# Patient Record
Sex: FEMALE | Race: WHITE | NOT HISPANIC OR LATINO
[De-identification: names, ages, dates, MRNs, and addresses within clinical notes are randomized per-mention and may not be internally consistent; named-entity substitution may affect disease eponyms.]

---

## 2023-12-04 ENCOUNTER — NON-APPOINTMENT (OUTPATIENT)
Age: 28
End: 2023-12-04

## 2023-12-04 ENCOUNTER — APPOINTMENT (OUTPATIENT)
Dept: INTERNAL MEDICINE | Facility: CLINIC | Age: 28
End: 2023-12-04
Payer: COMMERCIAL

## 2023-12-04 VITALS
HEIGHT: 65 IN | BODY MASS INDEX: 26.66 KG/M2 | DIASTOLIC BLOOD PRESSURE: 80 MMHG | OXYGEN SATURATION: 96 % | HEART RATE: 60 BPM | WEIGHT: 160 LBS | TEMPERATURE: 97.3 F | RESPIRATION RATE: 18 BRPM | SYSTOLIC BLOOD PRESSURE: 126 MMHG

## 2023-12-04 DIAGNOSIS — Z00.00 ENCOUNTER FOR GENERAL ADULT MEDICAL EXAMINATION W/OUT ABNORMAL FINDINGS: ICD-10-CM

## 2023-12-04 DIAGNOSIS — M25.569 PAIN IN UNSPECIFIED KNEE: ICD-10-CM

## 2023-12-04 PROCEDURE — 99385 PREV VISIT NEW AGE 18-39: CPT

## 2023-12-05 ENCOUNTER — TRANSCRIPTION ENCOUNTER (OUTPATIENT)
Age: 28
End: 2023-12-05

## 2023-12-06 DIAGNOSIS — Z78.9 OTHER SPECIFIED HEALTH STATUS: ICD-10-CM

## 2023-12-06 DIAGNOSIS — G43.109 MIGRAINE WITH AURA, NOT INTRACTABLE, W/OUT STATUS MIGRAINOSUS: ICD-10-CM

## 2023-12-06 DIAGNOSIS — Z82.0 FAMILY HISTORY OF EPILEPSY AND OTHER DISEASES OF THE NERVOUS SYSTEM: ICD-10-CM

## 2023-12-06 DIAGNOSIS — Z83.49 FAMILY HISTORY OF OTHER ENDOCRINE, NUTRITIONAL AND METABOLIC DISEASES: ICD-10-CM

## 2023-12-06 PROBLEM — M25.569 KNEE PAIN: Status: ACTIVE | Noted: 2023-12-04

## 2023-12-06 LAB
ALBUMIN SERPL ELPH-MCNC: 4.5 G/DL
ALP BLD-CCNC: 68 U/L
ALT SERPL-CCNC: 22 U/L
ANION GAP SERPL CALC-SCNC: 13 MMOL/L
AST SERPL-CCNC: 15 U/L
BILIRUB SERPL-MCNC: 0.4 MG/DL
BUN SERPL-MCNC: 9 MG/DL
CALCIUM SERPL-MCNC: 9.3 MG/DL
CHLORIDE SERPL-SCNC: 103 MMOL/L
CHOLEST SERPL-MCNC: 208 MG/DL
CO2 SERPL-SCNC: 25 MMOL/L
CREAT SERPL-MCNC: 0.9 MG/DL
EGFR: 89 ML/MIN/1.73M2
GLUCOSE SERPL-MCNC: 84 MG/DL
HCT VFR BLD CALC: 42.1 %
HDLC SERPL-MCNC: 66 MG/DL
HGB BLD-MCNC: 13.7 G/DL
LDLC SERPL CALC-MCNC: 123 MG/DL
MCHC RBC-ENTMCNC: 29.6 PG
MCHC RBC-ENTMCNC: 32.5 GM/DL
MCV RBC AUTO: 90.9 FL
NONHDLC SERPL-MCNC: 142 MG/DL
PLATELET # BLD AUTO: 269 K/UL
POTASSIUM SERPL-SCNC: 4.2 MMOL/L
PROT SERPL-MCNC: 7.1 G/DL
RBC # BLD: 4.63 M/UL
RBC # FLD: 13.2 %
SODIUM SERPL-SCNC: 140 MMOL/L
TRIGL SERPL-MCNC: 110 MG/DL
TSH SERPL-ACNC: 2.08 UIU/ML
WBC # FLD AUTO: 8.65 K/UL

## 2023-12-07 ENCOUNTER — NON-APPOINTMENT (OUTPATIENT)
Age: 28
End: 2023-12-07

## 2023-12-08 ENCOUNTER — APPOINTMENT (OUTPATIENT)
Dept: ORTHOPEDIC SURGERY | Facility: CLINIC | Age: 28
End: 2023-12-08
Payer: COMMERCIAL

## 2023-12-08 ENCOUNTER — NON-APPOINTMENT (OUTPATIENT)
Age: 28
End: 2023-12-08

## 2023-12-08 VITALS — BODY MASS INDEX: 26.66 KG/M2 | WEIGHT: 160 LBS | HEIGHT: 65 IN

## 2023-12-08 DIAGNOSIS — M25.562 PAIN IN LEFT KNEE: ICD-10-CM

## 2023-12-08 PROCEDURE — 99203 OFFICE O/P NEW LOW 30 MIN: CPT

## 2023-12-08 PROCEDURE — 73562 X-RAY EXAM OF KNEE 3: CPT | Mod: LT

## 2023-12-20 ENCOUNTER — OUTPATIENT (OUTPATIENT)
Dept: OUTPATIENT SERVICES | Facility: HOSPITAL | Age: 28
LOS: 1 days | End: 2023-12-20
Payer: COMMERCIAL

## 2023-12-20 ENCOUNTER — APPOINTMENT (OUTPATIENT)
Dept: MRI IMAGING | Facility: CLINIC | Age: 28
End: 2023-12-20
Payer: COMMERCIAL

## 2023-12-20 DIAGNOSIS — Z00.8 ENCOUNTER FOR OTHER GENERAL EXAMINATION: ICD-10-CM

## 2023-12-20 PROCEDURE — 73721 MRI JNT OF LWR EXTRE W/O DYE: CPT

## 2023-12-20 PROCEDURE — 73721 MRI JNT OF LWR EXTRE W/O DYE: CPT | Mod: 26,LT

## 2023-12-25 ENCOUNTER — NON-APPOINTMENT (OUTPATIENT)
Age: 28
End: 2023-12-25

## 2024-01-17 ENCOUNTER — APPOINTMENT (OUTPATIENT)
Dept: OBGYN | Facility: CLINIC | Age: 29
End: 2024-01-17
Payer: COMMERCIAL

## 2024-01-17 VITALS
HEIGHT: 65 IN | DIASTOLIC BLOOD PRESSURE: 70 MMHG | WEIGHT: 154 LBS | SYSTOLIC BLOOD PRESSURE: 110 MMHG | BODY MASS INDEX: 25.66 KG/M2

## 2024-01-17 DIAGNOSIS — N92.6 IRREGULAR MENSTRUATION, UNSPECIFIED: ICD-10-CM

## 2024-01-17 DIAGNOSIS — Z01.411 ENCOUNTER FOR GYNECOLOGICAL EXAMINATION (GENERAL) (ROUTINE) WITH ABNORMAL FINDINGS: ICD-10-CM

## 2024-01-17 PROCEDURE — 99385 PREV VISIT NEW AGE 18-39: CPT | Mod: 25

## 2024-01-17 PROCEDURE — 36415 COLL VENOUS BLD VENIPUNCTURE: CPT

## 2024-01-18 LAB
BASOPHILS # BLD AUTO: 0.04 K/UL
BASOPHILS NFR BLD AUTO: 0.7 %
C TRACH RRNA SPEC QL NAA+PROBE: NOT DETECTED
DHEA-S SERPL-MCNC: 152 UG/DL
EOSINOPHIL # BLD AUTO: 0.13 K/UL
EOSINOPHIL NFR BLD AUTO: 2.3 %
ESTRADIOL SERPL-MCNC: 98 PG/ML
FSH SERPL-MCNC: 2.5 IU/L
HCT VFR BLD CALC: 42 %
HGB BLD-MCNC: 14.2 G/DL
IMM GRANULOCYTES NFR BLD AUTO: 0.2 %
LH SERPL-ACNC: 12.6 IU/L
LYMPHOCYTES # BLD AUTO: 1.96 K/UL
LYMPHOCYTES NFR BLD AUTO: 34.8 %
MAN DIFF?: NORMAL
MCHC RBC-ENTMCNC: 30 PG
MCHC RBC-ENTMCNC: 33.8 GM/DL
MCV RBC AUTO: 88.8 FL
MONOCYTES # BLD AUTO: 0.57 K/UL
MONOCYTES NFR BLD AUTO: 10.1 %
N GONORRHOEA RRNA SPEC QL NAA+PROBE: NOT DETECTED
NEUTROPHILS # BLD AUTO: 2.93 K/UL
NEUTROPHILS NFR BLD AUTO: 51.9 %
PLATELET # BLD AUTO: 250 K/UL
PROGEST SERPL-MCNC: 9.6 NG/ML
PROLACTIN SERPL-MCNC: 24.4 NG/ML
RBC # BLD: 4.73 M/UL
RBC # FLD: 12.9 %
SOURCE AMPLIFICATION: NORMAL
SOURCE TP AMPLIFICATION: NORMAL
T VAGINALIS RRNA SPEC QL NAA+PROBE: NOT DETECTED
T4 FREE SERPL-MCNC: 1 NG/DL
TESTOST SERPL-MCNC: 9.2 NG/DL
TSH SERPL-ACNC: 1.68 UIU/ML
WBC # FLD AUTO: 5.64 K/UL

## 2024-01-18 NOTE — HISTORY OF PRESENT ILLNESS
[Currently Active] : currently active [Men] : men [Vaginal] : vaginal [No] : No [FreeTextEntry1] : 29yo  LMP 1/3/24 here for annual exam.  Pt reports long history of long cycles b/t 60-90 days.  TOP x 1 (medical)

## 2024-01-18 NOTE — DISCUSSION/SUMMARY
[FreeTextEntry1] : - Pap obtained today - Contraceptive options reviewed; pt happy w/condoms - STI testing offered; declined, recently done  Irregular Menses - Sono requisition given - Hormone panel sent

## 2024-01-23 ENCOUNTER — NON-APPOINTMENT (OUTPATIENT)
Age: 29
End: 2024-01-23

## 2024-01-24 ENCOUNTER — APPOINTMENT (OUTPATIENT)
Dept: DERMATOLOGY | Facility: CLINIC | Age: 29
End: 2024-01-24

## 2024-01-25 LAB — CYTOLOGY CVX/VAG DOC THIN PREP: NORMAL

## 2024-03-16 ENCOUNTER — APPOINTMENT (OUTPATIENT)
Dept: OPHTHALMOLOGY | Facility: CLINIC | Age: 29
End: 2024-03-16
Payer: COMMERCIAL

## 2024-03-16 ENCOUNTER — NON-APPOINTMENT (OUTPATIENT)
Age: 29
End: 2024-03-16

## 2024-03-16 PROCEDURE — 92004 COMPRE OPH EXAM NEW PT 1/>: CPT

## 2024-03-16 PROCEDURE — 92015 DETERMINE REFRACTIVE STATE: CPT

## 2024-03-18 ENCOUNTER — APPOINTMENT (OUTPATIENT)
Dept: OPHTHALMOLOGY | Facility: CLINIC | Age: 29
End: 2024-03-18

## 2024-03-19 ENCOUNTER — APPOINTMENT (OUTPATIENT)
Dept: OPHTHALMOLOGY | Facility: CLINIC | Age: 29
End: 2024-03-19

## 2024-03-26 ENCOUNTER — EMERGENCY (EMERGENCY)
Facility: HOSPITAL | Age: 29
LOS: 1 days | Discharge: HOME | End: 2024-03-26
Attending: EMERGENCY MEDICINE
Payer: COMMERCIAL

## 2024-03-26 VITALS
DIASTOLIC BLOOD PRESSURE: 94 MMHG | TEMPERATURE: 98 F | OXYGEN SATURATION: 99 % | RESPIRATION RATE: 16 BRPM | HEART RATE: 107 BPM | SYSTOLIC BLOOD PRESSURE: 133 MMHG | WEIGHT: 156.97 LBS

## 2024-03-26 VITALS
HEART RATE: 75 BPM | DIASTOLIC BLOOD PRESSURE: 88 MMHG | SYSTOLIC BLOOD PRESSURE: 126 MMHG | OXYGEN SATURATION: 100 % | RESPIRATION RATE: 15 BRPM | TEMPERATURE: 98 F

## 2024-03-26 LAB
ALBUMIN SERPL ELPH-MCNC: 4.3 G/DL — SIGNIFICANT CHANGE UP (ref 3.3–5.2)
ALP SERPL-CCNC: 63 U/L — SIGNIFICANT CHANGE UP (ref 40–120)
ALT FLD-CCNC: 15 U/L — SIGNIFICANT CHANGE UP
ANION GAP SERPL CALC-SCNC: 10 MMOL/L — SIGNIFICANT CHANGE UP (ref 5–17)
APPEARANCE UR: CLEAR — SIGNIFICANT CHANGE UP
AST SERPL-CCNC: 17 U/L — SIGNIFICANT CHANGE UP
BASOPHILS # BLD AUTO: 0.06 K/UL — SIGNIFICANT CHANGE UP (ref 0–0.2)
BASOPHILS NFR BLD AUTO: 0.7 % — SIGNIFICANT CHANGE UP (ref 0–2)
BILIRUB SERPL-MCNC: 0.2 MG/DL — LOW (ref 0.4–2)
BILIRUB UR-MCNC: NEGATIVE — SIGNIFICANT CHANGE UP
BUN SERPL-MCNC: 16.5 MG/DL — SIGNIFICANT CHANGE UP (ref 8–20)
CALCIUM SERPL-MCNC: 9.2 MG/DL — SIGNIFICANT CHANGE UP (ref 8.4–10.5)
CHLORIDE SERPL-SCNC: 101 MMOL/L — SIGNIFICANT CHANGE UP (ref 96–108)
CK SERPL-CCNC: 79 U/L — SIGNIFICANT CHANGE UP (ref 25–170)
CO2 SERPL-SCNC: 27 MMOL/L — SIGNIFICANT CHANGE UP (ref 22–29)
COLOR SPEC: YELLOW — SIGNIFICANT CHANGE UP
CREAT SERPL-MCNC: 0.72 MG/DL — SIGNIFICANT CHANGE UP (ref 0.5–1.3)
DIFF PNL FLD: NEGATIVE — SIGNIFICANT CHANGE UP
EGFR: 117 ML/MIN/1.73M2 — SIGNIFICANT CHANGE UP
EOSINOPHIL # BLD AUTO: 0.14 K/UL — SIGNIFICANT CHANGE UP (ref 0–0.5)
EOSINOPHIL NFR BLD AUTO: 1.5 % — SIGNIFICANT CHANGE UP (ref 0–6)
GLUCOSE SERPL-MCNC: 86 MG/DL — SIGNIFICANT CHANGE UP (ref 70–99)
GLUCOSE UR QL: NEGATIVE MG/DL — SIGNIFICANT CHANGE UP
HCG SERPL-ACNC: <4 MIU/ML — SIGNIFICANT CHANGE UP
HCT VFR BLD CALC: 38.8 % — SIGNIFICANT CHANGE UP (ref 34.5–45)
HGB BLD-MCNC: 13.1 G/DL — SIGNIFICANT CHANGE UP (ref 11.5–15.5)
IMM GRANULOCYTES NFR BLD AUTO: 0.2 % — SIGNIFICANT CHANGE UP (ref 0–0.9)
KETONES UR-MCNC: NEGATIVE MG/DL — SIGNIFICANT CHANGE UP
LEUKOCYTE ESTERASE UR-ACNC: NEGATIVE — SIGNIFICANT CHANGE UP
LIDOCAIN IGE QN: 26 U/L — SIGNIFICANT CHANGE UP (ref 22–51)
LYMPHOCYTES # BLD AUTO: 2.93 K/UL — SIGNIFICANT CHANGE UP (ref 1–3.3)
LYMPHOCYTES # BLD AUTO: 31.9 % — SIGNIFICANT CHANGE UP (ref 13–44)
MCHC RBC-ENTMCNC: 30.3 PG — SIGNIFICANT CHANGE UP (ref 27–34)
MCHC RBC-ENTMCNC: 33.8 GM/DL — SIGNIFICANT CHANGE UP (ref 32–36)
MCV RBC AUTO: 89.6 FL — SIGNIFICANT CHANGE UP (ref 80–100)
MONOCYTES # BLD AUTO: 0.57 K/UL — SIGNIFICANT CHANGE UP (ref 0–0.9)
MONOCYTES NFR BLD AUTO: 6.2 % — SIGNIFICANT CHANGE UP (ref 2–14)
NEUTROPHILS # BLD AUTO: 5.46 K/UL — SIGNIFICANT CHANGE UP (ref 1.8–7.4)
NEUTROPHILS NFR BLD AUTO: 59.5 % — SIGNIFICANT CHANGE UP (ref 43–77)
NITRITE UR-MCNC: NEGATIVE — SIGNIFICANT CHANGE UP
PH UR: 6.5 — SIGNIFICANT CHANGE UP (ref 5–8)
PLATELET # BLD AUTO: 242 K/UL — SIGNIFICANT CHANGE UP (ref 150–400)
POTASSIUM SERPL-MCNC: 4.1 MMOL/L — SIGNIFICANT CHANGE UP (ref 3.5–5.3)
POTASSIUM SERPL-SCNC: 4.1 MMOL/L — SIGNIFICANT CHANGE UP (ref 3.5–5.3)
PROT SERPL-MCNC: 7.1 G/DL — SIGNIFICANT CHANGE UP (ref 6.6–8.7)
PROT UR-MCNC: NEGATIVE MG/DL — SIGNIFICANT CHANGE UP
RBC # BLD: 4.33 M/UL — SIGNIFICANT CHANGE UP (ref 3.8–5.2)
RBC # FLD: 12.6 % — SIGNIFICANT CHANGE UP (ref 10.3–14.5)
SODIUM SERPL-SCNC: 138 MMOL/L — SIGNIFICANT CHANGE UP (ref 135–145)
SP GR SPEC: 1.01 — SIGNIFICANT CHANGE UP (ref 1–1.03)
UROBILINOGEN FLD QL: 0.2 MG/DL — SIGNIFICANT CHANGE UP (ref 0.2–1)
WBC # BLD: 9.18 K/UL — SIGNIFICANT CHANGE UP (ref 3.8–10.5)
WBC # FLD AUTO: 9.18 K/UL — SIGNIFICANT CHANGE UP (ref 3.8–10.5)

## 2024-03-26 PROCEDURE — 83690 ASSAY OF LIPASE: CPT

## 2024-03-26 PROCEDURE — 80053 COMPREHEN METABOLIC PANEL: CPT

## 2024-03-26 PROCEDURE — 81003 URINALYSIS AUTO W/O SCOPE: CPT

## 2024-03-26 PROCEDURE — 82550 ASSAY OF CK (CPK): CPT

## 2024-03-26 PROCEDURE — 87086 URINE CULTURE/COLONY COUNT: CPT

## 2024-03-26 PROCEDURE — 99283 EMERGENCY DEPT VISIT LOW MDM: CPT

## 2024-03-26 PROCEDURE — 84702 CHORIONIC GONADOTROPIN TEST: CPT

## 2024-03-26 PROCEDURE — 36415 COLL VENOUS BLD VENIPUNCTURE: CPT

## 2024-03-26 PROCEDURE — 85025 COMPLETE CBC W/AUTO DIFF WBC: CPT

## 2024-03-26 PROCEDURE — 99284 EMERGENCY DEPT VISIT MOD MDM: CPT

## 2024-03-26 RX ORDER — MECLIZINE HCL 12.5 MG
25 TABLET ORAL ONCE
Refills: 0 | Status: COMPLETED | OUTPATIENT
Start: 2024-03-26 | End: 2024-03-26

## 2024-03-26 RX ORDER — SODIUM CHLORIDE 9 MG/ML
1000 INJECTION INTRAMUSCULAR; INTRAVENOUS; SUBCUTANEOUS ONCE
Refills: 0 | Status: COMPLETED | OUTPATIENT
Start: 2024-03-26 | End: 2024-03-26

## 2024-03-26 RX ADMIN — Medication 25 MILLIGRAM(S): at 21:21

## 2024-03-26 RX ADMIN — SODIUM CHLORIDE 1000 MILLILITER(S): 9 INJECTION INTRAMUSCULAR; INTRAVENOUS; SUBCUTANEOUS at 21:21

## 2024-03-26 NOTE — ED STATDOCS - PATIENT PORTAL LINK FT
You can access the FollowMyHealth Patient Portal offered by [facility] by registering at the following website: [email or web address]. By joining [facility]’s FollowMyHealth portal, you will also be able to view your health information using other applications (apps) compatible with our system.

## 2024-03-26 NOTE — ED STATDOCS - CARE PROVIDER_API CALL
Jose Pichardo  Neurology  [address] [place] [address], [facility] [address]  [place] [address]  Phone: [phone]  Fax: [phone]  Follow Up Time: Routine

## 2024-03-26 NOTE — ED STATDOCS - NS ED ROS FT
CONSTITUTIONAL - no  fever, no diaphoresis, no weight change  SKIN - no rash  HEMATOLOGIC - no bleeding, no bruising  EYES - no eye pain, no blurred vision  ENT  (+) tinitus , no pain  RESPIRATORY - no shortness of breath, no cough  CARDIAC - no chest pain, no palpitations  GI - no abd pain, no nausea, no vomiting, no diarrhea, no constipation, no bleeding  GENITO-URINARY - no discharge, no dysuria; no hematuria,   ENDO - no polydipsia, no polyuria, no heat/no cold intolerance  MUSCULOSKELETAL - no joint pain, no swelling, no redness  NEUROLOGIC - no weakness, (+) headache, no anesthesia, no paresthesias (+) mild tremor  PSYCH - no anxiety, non suicidal, non homicidal, no hallucination, no depression

## 2024-03-26 NOTE — ED ADULT TRIAGE NOTE - CHIEF COMPLAINT QUOTE
Patient presents to ED c/o muscle twitching, headache, tinnitus in left ear, and feeling off balance. States her symptoms started a few days ago.

## 2024-03-26 NOTE — ED STATDOCS - CLINICAL SUMMARY MEDICAL DECISION MAKING FREE TEXT BOX
20-year-old female history of ADHD,  IBS, ocular migraine presents with multiple complaints  of diffuse tremor, headache, feeling off balance, tinnitus started 2 days ago.    Patient also report having  "blacking out "in her eyes when she  gets up from bed.  No episodes of near syncope about she is walking or exerting herself. no fever, no nausea or vomiting.  No cough or congestion, no dysuria. Patient report having yeast infection and took over-the-counter medication.  Patient  report that she is a   but has not done anything extra.  patient denies any change in medication recently.  Patient denies trauma.  Patient reports symptoms different than her ocular migraine.      No focal neurologic deficit on exam.  Symptom likely vertical.  Tremor is very mild and not debilitating.  Will check for electrolytes, CPK, urine.  Will give IV fluid hydration and meclizine for dizziness. 20-year-old female history of ADHD,  IBS, ocular migraine presents with multiple complaints  of diffuse tremor, headache, feeling off balance, tinnitus started 2 days ago.    Patient also report having  "blacking out "in her eyes when she  gets up from bed.  No episodes of near syncope about she is walking or exerting herself. no fever, no nausea or vomiting.  No cough or congestion, no dysuria. Patient report having yeast infection and took over-the-counter medication.  Patient  report that she is a   but has not done anything extra.  patient denies any change in medication recently.  Patient denies trauma.  Patient reports symptoms different than her ocular migraine.      No focal neurologic deficit on exam.  Symptom likely vertical.  Tremor is very mild and not debilitating.  Will check for electrolytes, CPK, urine.  Will give IV fluid hydration and meclizine for dizziness.    Lab values do not require emergent intervention. CPK wnl. Urinalysis demonstrates no acute pathology.  Urine culture pending. patient report feeling similar to prior but comfortable going home with neurology follow up.

## 2024-03-26 NOTE — ED STATDOCS - OBJECTIVE STATEMENT
20-year-old female history of ADHD,  IBS, ocular migraine presents with multiple complaints  of diffuse tremor, headache, feeling off balance, tinnitus started 2 days ago.    Patient also report having  "blacking out "in her eyes when she  gets up from bed.  No episodes of near syncope about she is walking or exerting herself. no fever, no nausea or vomiting.  No cough or congestion, no dysuria. Patient report having yeast infection and took over-the-counter medication.  Patient  report that she is a   but has not done anything extra.  patient denies any change in medication recently.  Patient denies trauma.  Patient reports symptoms different than her ocular migraine.

## 2024-03-26 NOTE — ED STATDOCS - NSFOLLOWUPINSTRUCTIONS_ED_ALL_ED_FT
** Please follow up with neurology  ** please return to the ED if worsening symptoms, such as trouble seeing or walking, or worsening neurological symptoms.

## 2024-03-28 ENCOUNTER — APPOINTMENT (OUTPATIENT)
Dept: INTERNAL MEDICINE | Facility: CLINIC | Age: 29
End: 2024-03-28
Payer: COMMERCIAL

## 2024-03-28 VITALS
HEART RATE: 67 BPM | TEMPERATURE: 97.8 F | DIASTOLIC BLOOD PRESSURE: 82 MMHG | SYSTOLIC BLOOD PRESSURE: 132 MMHG | BODY MASS INDEX: 24.99 KG/M2 | OXYGEN SATURATION: 100 % | WEIGHT: 150 LBS | HEIGHT: 65 IN

## 2024-03-28 DIAGNOSIS — Z86.69 PERSONAL HISTORY OF OTHER DISEASES OF THE NERVOUS SYSTEM AND SENSE ORGANS: ICD-10-CM

## 2024-03-28 DIAGNOSIS — K58.9 IRRITABLE BOWEL SYNDROME WITHOUT DIARRHEA: ICD-10-CM

## 2024-03-28 DIAGNOSIS — F90.9 ATTENTION-DEFICIT HYPERACTIVITY DISORDER, UNSPECIFIED TYPE: ICD-10-CM

## 2024-03-28 DIAGNOSIS — R25.1 TREMOR, UNSPECIFIED: ICD-10-CM

## 2024-03-28 DIAGNOSIS — R79.89 OTHER SPECIFIED ABNORMAL FINDINGS OF BLOOD CHEMISTRY: ICD-10-CM

## 2024-03-28 DIAGNOSIS — R42 DIZZINESS AND GIDDINESS: ICD-10-CM

## 2024-03-28 DIAGNOSIS — R51.9 HEADACHE, UNSPECIFIED: ICD-10-CM

## 2024-03-28 DIAGNOSIS — G25.2 OTHER SPECIFIED FORMS OF TREMOR: ICD-10-CM

## 2024-03-28 DIAGNOSIS — H93.19 TINNITUS, UNSPECIFIED EAR: ICD-10-CM

## 2024-03-28 LAB
CULTURE RESULTS: SIGNIFICANT CHANGE UP
SPECIMEN SOURCE: SIGNIFICANT CHANGE UP

## 2024-03-28 PROCEDURE — G2211 COMPLEX E/M VISIT ADD ON: CPT

## 2024-03-28 PROCEDURE — 99214 OFFICE O/P EST MOD 30 MIN: CPT

## 2024-03-28 NOTE — HISTORY OF PRESENT ILLNESS
[FreeTextEntry8] : SHANIQUE ZUNIGA is a 28 year F who presents today with complaints of a tremor involviing all 4 extremities. Her hands  are most affected.  She was seen in the ER @ [facility]. last marijuana lastt utilized 2 .5 weeks ago.

## 2024-03-30 PROBLEM — F90.9 ADHD: Status: ACTIVE | Noted: 2023-12-04

## 2024-03-30 PROBLEM — R79.89 ELEVATED PROLACTIN LEVEL: Status: ACTIVE | Noted: 2024-01-19

## 2024-03-30 PROBLEM — G25.2 TREMOR, COARSE: Status: RESOLVED | Noted: 2024-03-28 | Resolved: 2024-03-30

## 2024-03-30 RX ORDER — CAMPHOR 0.45 %
GEL (GRAM) TOPICAL
Refills: 0 | Status: ACTIVE | COMMUNITY

## 2024-03-30 NOTE — REVIEW OF SYSTEMS
[Recent Change In Weight] : ~T recent weight change [Headache] : headache [Dizziness] : dizziness [Negative] : Heme/Lymph

## 2024-03-30 NOTE — PHYSICAL EXAM
[Normal Sclera/Conjunctiva] : normal sclera/conjunctiva [EOMI] : extraocular movements intact [PERRL] : pupils equal round and reactive to light [Regular Rhythm] : with a regular rhythm [Normal Rate] : normal rate  [Normal Supraclavicular Nodes] : no supraclavicular lymphadenopathy [Normal Anterior Cervical Nodes] : no anterior cervical lymphadenopathy [No CVA Tenderness] : no CVA  tenderness [Grossly Normal Strength/Tone] : grossly normal strength/tone [Normal Gait] : normal gait [No Focal Deficits] : no focal deficits [Normal] : the cranial nerves were intact [Speech Grossly Normal] : speech grossly normal [Alert and Oriented x3] : oriented to person, place, and time [Normal Affect] : the affect was normal [Normal Mood] : the mood was normal

## 2024-04-01 ENCOUNTER — RESULT REVIEW (OUTPATIENT)
Age: 29
End: 2024-04-01

## 2024-04-01 ENCOUNTER — APPOINTMENT (OUTPATIENT)
Dept: ULTRASOUND IMAGING | Facility: CLINIC | Age: 29
End: 2024-04-01
Payer: COMMERCIAL

## 2024-04-01 ENCOUNTER — OUTPATIENT (OUTPATIENT)
Dept: OUTPATIENT SERVICES | Facility: HOSPITAL | Age: 29
LOS: 1 days | End: 2024-04-01
Payer: COMMERCIAL

## 2024-04-01 ENCOUNTER — APPOINTMENT (OUTPATIENT)
Dept: NEUROLOGY | Facility: CLINIC | Age: 29
End: 2024-04-01
Payer: COMMERCIAL

## 2024-04-01 VITALS
WEIGHT: 150 LBS | DIASTOLIC BLOOD PRESSURE: 87 MMHG | SYSTOLIC BLOOD PRESSURE: 131 MMHG | HEART RATE: 94 BPM | OXYGEN SATURATION: 100 % | BODY MASS INDEX: 24.99 KG/M2 | HEIGHT: 65 IN

## 2024-04-01 DIAGNOSIS — N92.6 IRREGULAR MENSTRUATION, UNSPECIFIED: ICD-10-CM

## 2024-04-01 DIAGNOSIS — F41.9 ANXIETY DISORDER, UNSPECIFIED: ICD-10-CM

## 2024-04-01 DIAGNOSIS — F32.A ANXIETY DISORDER, UNSPECIFIED: ICD-10-CM

## 2024-04-01 LAB — PROLACTIN SERPL-MCNC: 8.1 NG/ML

## 2024-04-01 PROCEDURE — 99215 OFFICE O/P EST HI 40 MIN: CPT

## 2024-04-01 PROCEDURE — G2211 COMPLEX E/M VISIT ADD ON: CPT

## 2024-04-01 PROCEDURE — 76856 US EXAM PELVIC COMPLETE: CPT | Mod: 26

## 2024-04-01 PROCEDURE — 99205 OFFICE O/P NEW HI 60 MIN: CPT

## 2024-04-01 PROCEDURE — 76856 US EXAM PELVIC COMPLETE: CPT

## 2024-04-01 PROCEDURE — 76830 TRANSVAGINAL US NON-OB: CPT

## 2024-04-01 PROCEDURE — 76830 TRANSVAGINAL US NON-OB: CPT | Mod: 26

## 2024-04-01 RX ORDER — ESCITALOPRAM OXALATE 20 MG/1
20 TABLET, FILM COATED ORAL
Refills: 0 | Status: ACTIVE | COMMUNITY

## 2024-04-01 RX ORDER — ELECTROLYTES/DEXTROSE
SOLUTION, ORAL ORAL
Refills: 0 | Status: ACTIVE | COMMUNITY

## 2024-04-01 RX ORDER — OMEGA-3/DHA/EPA/FISH OIL 300-1000MG
400 CAPSULE ORAL
Refills: 0 | Status: ACTIVE | COMMUNITY

## 2024-04-01 RX ORDER — ADHESIVE TAPE 3"X 2.3 YD
TAPE, NON-MEDICATED TOPICAL
Refills: 0 | Status: ACTIVE | COMMUNITY

## 2024-04-01 RX ORDER — ATOMOXETINE 40 MG/1
40 CAPSULE ORAL
Refills: 0 | Status: ACTIVE | COMMUNITY

## 2024-04-01 NOTE — ASSESSMENT
[FreeTextEntry1] : - 29 y/o F with PMHx ADHD, anxiety, IBS, migraine headaches presenting today for initial neurological evaluation.  Endorsing constant BUE tremor onset 3/25, worsening migraine headaches, tinnitus, balance issues.  Labwork 3/2024- CBC, CMP, TSH, T4 WNL Physical exam with mild temporal tenderness to palpation, neck spasm b/l, palpable TMJ crepitus. Tremor BUE with posture. Beighton score 5/10 - hyperflexibility of b/l pinkies, b/l elbows, able to flex spine and touch floor Based on clinical history and presentation, HAs meet criteria for migraine HAs without aura with status migrainosus.  Tremor of BUE likely enhanced physiological tremor vs essential tremor.   Recommendations: - MRI head and IACs w/w/o to evaluate for structural abnormalities - will call pt with results - start propranolol 40 mg daily for concomitant treatment of tremor and migraine HAs, consider increasing dose as tolerated - possible side effects discussed - start OTC riboflavin (vitamin B2) 400 mg daily for migraine preventive - continue OTC magnesium 400 mg daily for migraine preventive, hold for diarrhea - start sumatriptan 100 mg PRN for migraine abortive - take 1 tab at onset of HA, may repeat in 2h if needed, no more than 2 pills in 24 h - start reglan 10 mg PRN for n/v - rheumatology referral, to evaluate hypermobility syndrome, Beighton score 5/10 on physical exam today - f/u with psych re: Lexapro and Strattera - f/u dentistry re: TMJ crepitus, grinding/clenching - lifestyle modifications discussed - proper intake and hydration, reduce caffeine intake, routine physical activity as tolerated  Patient to return to office in 3 months, or sooner if needed. Patient understands to seek urgent medical evaluation for any new or worsening symptoms.

## 2024-04-01 NOTE — PHYSICAL EXAM
[FreeTextEntry1] : NEURO: Mental Status Oriented to person, place, time, and situation Speech is clear, fluent, and spontaneous. Comprehension and memory intact   Cranial Nerves Visual fields full to confrontation Pupils equal, round, reactive to light. Extraocular movements intact. No nystagmus or ptosis. Facial sensation intact and symmetric in V1, V2, V3 Facial movement intact and symmetric Uvula midline, soft palate elevates normally Bilateral shoulder shrug intact Tongue midline, no tongue bite sign or deviation on protrusion  Mild temporal R>L tenderness to palpation Palpable TMJ crepitus b/l Neck ROM intact, mild spasm b/l, not significantly tender to palpation   Motor Tone and bulk intact Shoulder abduction: 5/5 b/l Elbow flexion/extension: 5/5 bl Hand : 5/5 b/l Hip flexion/extension: 5/5 b/l Knee flexion/extension: 5/5 b/l Dorsiflexion/plantar flexion: 5/5 b/l No pronator drift   Sensation Light touch grossly intact   Deep Tendon Reflexes Biceps 2+ b/l Brachioradialis 2+ b/l Patellar 2+ b/l   Coordination Normal finger to nose bilaterally No past pointing Fine postural tremor BUE   Gait Normal stance, stride, and pivot turn Tandem walk intact Negative Romberg.     GEN: awake, alert, interactive, no acute distress EYES: sclera white, conjunctiva clear, no redness or discharge ENT: normal appearing outer ears, hearing grossly intact PULM: normal respiratory rhythm and effort, speaking in full sentences without distress, no accessory muscle usage EXT: moving all extremities spontaneously, no edema, no cyanosis SKIN: warm, dry, no lesions on visualized skin  Beighton score 5/10 - hyperflexibility of b/l pinkies, b/l elbows, able to flex spine and touch floor

## 2024-04-01 NOTE — HISTORY OF PRESENT ILLNESS
[___ On how many days in the last 3 months did you miss work or school because of your headaches?] : On how many days in the last 3 months did you miss work or school because of your headaches? [unfilled] day(s) [___ On how many days in the last 3 months did you not do household work because of your headaches?] : On how many days in the last 3 months did you not do household work because of your headaches? [unfilled] day(s) [___ How many days in the last 3 months was your productivity at work or school reduced by half or more] : How many days in the last 3 months was your productivity at work or school reduced by half or more because of your headaches? [unfilled] day(s) [___ How many days in the last 3 months was your productivity in household work reduced by half or more] : How many days in the last 3 months was your productivity in household work reduced by half or more because of headaches? [unfilled] day(s) [___ On how many days in the last 3 months did you miss family, social or leisure activities because of your headaches?] : On how many days in the last 3 months did you miss family, social or leisure activities because of your headaches? [unfilled] day(s) [___ On how many days in the last 3 months did you have a headache?] : On how many days in the last 3 months did you have a headache? [unfilled] days [___ On a scale of 1-10, on average how painful were these headaches? (0 = no pain and 10 = pain as bad as it can be)] : On a scale of 1-10, on average how painful were these headaches? [unfilled] of 10 [6 to 10, MIDAS Grade II, Mild disability] : 6 to 10, MIDAS Grade II, Mild disability [FreeTextEntry1] : Flora Poe is a 28 year old female with medical history significant for ADHD, anxiety, IBS, and migraine headaches, referred by PCP.  Since last Monday, 3/25/2024, she has been having constant tremor of bilateral upper extremities.  She has had tremors before when she was sick or underwent strenuous physical activity, it lasted for a day at most.  It has never been constant for several days straight like this current time.  It is primarily affecting her bilateral hands, when it gets worse she can feel it in her legs and her upper arms.  It makes her feel off balance, and she has a sensation that she is on a boat swaying around.  She does not see any external room spinning or vertigo.  She has tinnitus in the past both years, however in the past week it has been getting more consistent and louder.  She gets nausea at baseline with her migraine headaches, as well as her irritable bowel syndrome.  She was seen at that ED at [place] on 3/26/2024, who was diagnosed with vertigo, they gave her medication which was not helpful to her symptoms.  She also saw her PCP on 3/28/2024.  She had lab work done which was WNL.  She thought that her tremor was due to her recent increase of the Strattera 40 mg to 80 mg, she reduced this.  She also went a few days without the Strattera but did not not notice any change in her tremors.  HAs began:  2015. Ebb and flow. Can go a few months without them. Had headache every day for 4 days straight. No break. Quality: Right-sided, stabbing, throbbing, pulse. Once left sided and ocular when she was pregnant.  Severity: 5/10 Disability: yes Duration: all day Frequency: Worsening since 3/25. Temporal course: gradual build up Time of day: almost always afternoon, btwn 1-4pm  Pain Free between Attacks: yes Triggers: stress, IBS, dehydration,  Relieving factors: turn off light and noises Exacerbating factors: physical activity    Prodrome or postdrome: brain fog and fatigue Aura: none ocular - 1 episode, static TV out of Left eye left side - while pregnant.    Associated Symptoms: Nausea + Vomiting - Photophobia + Phonophobia: + Osmophobia + brain fog/difficulty concentrating + Irritability - Allodynia - Blurred Vision + Neck pain -  Vertigo -  Treatment present: Acute: OTC Advil  Preventive: Magnesium 400 mg.   Prior medications: Sumatriptan 50 mg was very helpful, ran out.    Non-pharmacologic Tx: headache hat   Imaging: never   Labs:  labs WNL   Additional Social/Work History: Occupation:  , college admissions.  Habits: Caffeine: 1 coffee    ETOH: once a month.  Tobacco: none ,  Drugs:   THC sleep gummies a couple weeks ago.  Exercise: figure skating, weight lifting Diet: 3 regular meals.     hydration: a lot.  Ophthalmologic: up to date. wears contacts and glasses. last month.  Sleep: 8-9 hrs at most, interrupted. does not snore  Dental: grinding, clenching. saw dentist last month. Sinus/Allergies: chronic sniffling. spring allergies. HAs worse during this season.  Head Trauma: concussion 2014 fell and roll down a hill.  Hypermobility: always been hyperextended  Psych: Lexapro improved stress, for anxiety and depression   Sex Active/Pregnancy planning: contraception: none.  Menstruation: irregular  Associated with menses: none   FH: hypothyroid paternal grandfather migraines dad- AUD, tremor.   --------- Per [facility] ED 3/26/24: "presents with multiple complaints of diffuse tremor, headache, feeling off balance, tinnitus started 2 days ago. Patient also report having "blacking out "in her eyes when she gets up from bed. No episodes of near syncope about she is walking or exerting herself. no fever, no nausea or vomiting. No cough or congestion, no dysuria. Patient report having yeast infection and took over-the-counter medication. Patient report that she is a  but has not done anything extra. patient denies any change in medication recently. Patient denies trauma. Patient reports symptoms different than her ocular migraine."  Labwork 3/2024- CBC, CMP, TSH, T4 WNL  Per PCP 3/28/24: "She is here after a recent ER visit @ [facility] for complaints of an involuntary tremor/muscle twitching that is mostly confined to her extremities. (I was able to review the notes and labs from the ER visit.) Her upper extremities (hands ) are the most affected. She reports a history of ADHD anxiety, and migraine headaches-She is maintained on Strattera and Lexapro. Both of these medications have been titrated up in the last few weeks. Her only drug use is marijuana edibles-she last had this 2.5 weeks ago. She does not drink excessive ETOH (reports ETOH approx once monthly). She has an extensive family history of thyroid disease, but no family history of Essential Tremor.. Her last thyroid blood test was normal in January of this year. 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 Chemistries were drawn a few days ago in the ER (I reviewed them-there were no glaring abnormalities). I have strongly advised that she d/c the ADHD medication, while not a true stimulant. it may have stimulant features resulting in tremor. Literature review shows that up to 5% of patients on Strattera can develop a tremor. I have advised a Neuro consult as well."

## 2024-04-01 NOTE — HISTORY OF PRESENT ILLNESS
[___ On how many days in the last 3 months did you miss work or school because of your headaches?] : On how many days in the last 3 months did you miss work or school because of your headaches? [unfilled] day(s) [___ On how many days in the last 3 months did you not do household work because of your headaches?] : On how many days in the last 3 months did you not do household work because of your headaches? [unfilled] day(s) [___ How many days in the last 3 months was your productivity at work or school reduced by half or more] : How many days in the last 3 months was your productivity at work or school reduced by half or more because of your headaches? [unfilled] day(s) [___ How many days in the last 3 months was your productivity in household work reduced by half or more] : How many days in the last 3 months was your productivity in household work reduced by half or more because of headaches? [unfilled] day(s) [___ On how many days in the last 3 months did you miss family, social or leisure activities because of your headaches?] : On how many days in the last 3 months did you miss family, social or leisure activities because of your headaches? [unfilled] day(s) [___ On how many days in the last 3 months did you have a headache?] : On how many days in the last 3 months did you have a headache? [unfilled] days [___ On a scale of 1-10, on average how painful were these headaches? (0 = no pain and 10 = pain as bad as it can be)] : On a scale of 1-10, on average how painful were these headaches? [unfilled] of 10 [6 to 10, MIDAS Grade II, Mild disability] : 6 to 10, MIDAS Grade II, Mild disability [FreeTextEntry1] : Flora Poe is a 28 year old female with medical history significant for ADHD, anxiety, IBS, and migraine headaches, referred by PCP.  Since last Monday, 3/25/2024, she has been having constant tremor of bilateral upper extremities.  She has had tremors before when she was sick or underwent strenuous physical activity, it lasted for a day at most.  It has never been constant for several days straight like this current time.  It is primarily affecting her bilateral hands, when it gets worse she can feel it in her legs and her upper arms.  It makes her feel off balance, and she has a sensation that she is on a boat swaying around.  She does not see any external room spinning or vertigo.  She has tinnitus in the past both years, however in the past week it has been getting more consistent and louder.  She gets nausea at baseline with her migraine headaches, as well as her irritable bowel syndrome.  She was seen at that ED at [place] on 3/26/2024, who was diagnosed with vertigo, they gave her medication which was not helpful to her symptoms.  She also saw her PCP on 3/28/2024.  She had lab work done which was WNL.  She thought that her tremor was due to her recent increase of the Strattera 40 mg to 80 mg, she reduced this.  She also went a few days without the Strattera but did not not notice any change in her tremors.  HAs began:  2015. Ebb and flow. Can go a few months without them. Had headache every day for 4 days straight. No break. Quality: Right-sided, stabbing, throbbing, pulse. Once left sided and ocular when she was pregnant.  Severity: 5/10 Disability: yes Duration: all day Frequency: Worsening since 3/25. Temporal course: gradual build up Time of day: almost always afternoon, btwn 1-4pm  Pain Free between Attacks: yes Triggers: stress, IBS, dehydration,  Relieving factors: turn off light and noises Exacerbating factors: physical activity    Prodrome or postdrome: brain fog and fatigue Aura: none ocular - 1 episode, static TV out of Left eye left side - while pregnant.    Associated Symptoms: Nausea + Vomiting - Photophobia + Phonophobia: + Osmophobia + brain fog/difficulty concentrating + Irritability - Allodynia - Blurred Vision + Neck pain -  Vertigo -  Treatment present: Acute: OTC Advil  Preventive: Magnesium 400 mg.   Prior medications: Sumatriptan 50 mg was very helpful, ran out.    Non-pharmacologic Tx: headache hat   Imaging: never   Labs:  labs WNL   Additional Social/Work History: Occupation:  , college admissions.  Habits: Caffeine: 1 coffee    ETOH: once a month.  Tobacco: none ,  Drugs:   THC sleep gummies a couple weeks ago.  Exercise: figure skating, weight lifting Diet: 3 regular meals.     hydration: a lot.  Ophthalmologic: up to date. wears contacts and glasses. last month.  Sleep: 8-9 hrs at most, interrupted. does not snore  Dental: grinding, clenching. saw dentist last month. Sinus/Allergies: chronic sniffling. spring allergies. HAs worse during this season.  Head Trauma: concussion 2014 fell and roll down a hill.  Hypermobility: always been hyperextended  Psych: Lexapro improved stress, for anxiety and depression   Sex Active/Pregnancy planning: contraception: none.  Menstruation: irregular  Associated with menses: none   FH: hypothyroid paternal grandfather migraines dad- AUD, tremor.   --------- Per [facility] ED 3/26/24: "presents with multiple complaints of diffuse tremor, headache, feeling off balance, tinnitus started 2 days ago. Patient also report having "blacking out "in her eyes when she gets up from bed. No episodes of near syncope about she is walking or exerting herself. no fever, no nausea or vomiting. No cough or congestion, no dysuria. Patient report having yeast infection and took over-the-counter medication. Patient report that she is a  but has not done anything extra. patient denies any change in medication recently. Patient denies trauma. Patient reports symptoms different than her ocular migraine."  Labwork 3/2024- CBC, CMP, TSH, T4 WNL  Per PCP 3/28/24: "She is here after a recent ER visit @ [facility] for complaints of an involuntary tremor/muscle twitching that is mostly confined to her extremities. (I was able to review the notes and labs from the ER visit.) Her upper extremities (hands ) are the most affected. She reports a history of ADHD anxiety, and migraine headaches-She is maintained on Strattera and Lexapro. Both of these medications have been titrated up in the last few weeks. Her only drug use is marijuana edibles-she last had this 2.5 weeks ago. She does not drink excessive ETOH (reports ETOH approx once monthly). She has an extensive family history of thyroid disease, but no family history of Essential Tremor.. Her last thyroid blood test was normal in January of this year. 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 Chemistries were drawn a few days ago in the ER (I reviewed them-there were no glaring abnormalities). I have strongly advised that she d/c the ADHD medication, while not a true stimulant. it may have stimulant features resulting in tremor. Literature review shows that up to 5% of patients on Strattera can develop a tremor. I have advised a Neuro consult as well."

## 2024-04-01 NOTE — ASSESSMENT
[FreeTextEntry1] : - 27 y/o F with PMHx ADHD, anxiety, IBS, migraine headaches presenting today for initial neurological evaluation.  Endorsing constant BUE tremor onset 3/25, worsening migraine headaches, tinnitus, balance issues.  Labwork 3/2024- CBC, CMP, TSH, T4 WNL Physical exam with mild temporal tenderness to palpation, neck spasm b/l, palpable TMJ crepitus. Tremor BUE with posture. Beighton score 5/10 - hyperflexibility of b/l pinkies, b/l elbows, able to flex spine and touch floor Based on clinical history and presentation, HAs meet criteria for migraine HAs without aura with status migrainosus.  Tremor of BUE likely enhanced physiological tremor vs essential tremor.   Recommendations: - MRI head and IACs w/w/o to evaluate for structural abnormalities - will call pt with results - start propranolol 40 mg daily for concomitant treatment of tremor and migraine HAs, consider increasing dose as tolerated - possible side effects discussed - start OTC riboflavin (vitamin B2) 400 mg daily for migraine preventive - continue OTC magnesium 400 mg daily for migraine preventive, hold for diarrhea - start sumatriptan 100 mg PRN for migraine abortive - take 1 tab at onset of HA, may repeat in 2h if needed, no more than 2 pills in 24 h - start reglan 10 mg PRN for n/v - rheumatology referral, to evaluate hypermobility syndrome, Beighton score 5/10 on physical exam today - f/u with psych re: Lexapro and Strattera - f/u dentistry re: TMJ crepitus, grinding/clenching - lifestyle modifications discussed - proper intake and hydration, reduce caffeine intake, routine physical activity as tolerated  Patient to return to office in 3 months, or sooner if needed. Patient understands to seek urgent medical evaluation for any new or worsening symptoms.

## 2024-04-03 ENCOUNTER — APPOINTMENT (OUTPATIENT)
Dept: RHEUMATOLOGY | Facility: CLINIC | Age: 29
End: 2024-04-03
Payer: COMMERCIAL

## 2024-04-03 ENCOUNTER — NON-APPOINTMENT (OUTPATIENT)
Age: 29
End: 2024-04-03

## 2024-04-03 VITALS
HEIGHT: 65 IN | DIASTOLIC BLOOD PRESSURE: 81 MMHG | HEART RATE: 71 BPM | TEMPERATURE: 97.7 F | BODY MASS INDEX: 24.99 KG/M2 | WEIGHT: 150 LBS | OXYGEN SATURATION: 98 % | SYSTOLIC BLOOD PRESSURE: 113 MMHG

## 2024-04-03 DIAGNOSIS — M25.50 PAIN IN UNSPECIFIED JOINT: ICD-10-CM

## 2024-04-03 DIAGNOSIS — G90.A POSTURAL ORTHOSTATIC TACHYCARDIA SYNDROME [POTS]: ICD-10-CM

## 2024-04-03 DIAGNOSIS — I73.00 RAYNAUD'S SYNDROME W/OUT GANGRENE: ICD-10-CM

## 2024-04-03 PROCEDURE — 99205 OFFICE O/P NEW HI 60 MIN: CPT

## 2024-04-03 PROCEDURE — 99215 OFFICE O/P EST HI 40 MIN: CPT

## 2024-04-03 NOTE — HISTORY OF PRESENT ILLNESS
[FreeTextEntry1] : 28 year old F with ADHD, anxiety, migraine headaches, IBS , PCOS   Following with neurology for involuntary tremor/muscle twitching in her hands . Was in the ER at [facility]  for hand tremor  and  when she was going from lying to standing  visions was blacking out. She had headaches, muscle weakness, and worse fatigue during this time For the last 11 days, she has had hand tremors, dizziness, and worsening headaches, tinnitus  She  tapered down her ADHD medication as per her PCP to help with hand tremors She was started on propranolol for tremors and migraines by neurologist, Also pending MRI. Started propranolol yesterday  She was referred by neurology  to rheum to evaluate hypermobility syndrome  Patient has tight shoulders, pain in the knees, pain in her upper back and sometimes in lower back.  Patient reports elbows and knee sometimes become red, hot but not necessarily painful or swollen ; worse with use.  She also has TMJ  Denies  dislocations/subluxation hx Used to get a lot of sprains in her wrists and ankles when she was younger  Patient does not have significant skin hyperextensibility She reports easy bruising and it seems like she has recently had worsening symptoms that are likely POTS; she did try a version of tilt table test on her own at home which wasnt revealing She denies  hernias, prolapse, known hx of aortic aneurysm Numbness in left big toe chronic Toes turning purple not related to temp recently. Initially she thought this was related to ice-skating however she has been taking a break and still has discoloration in her toes She has myopia  She denies epicanthal folds, subcutaneous nodules, foot deformities, muscle weakness, cavitary prolapse, pectus deformity,  retinal detachment, keratoconus, lens subluxation, hive She has chronic fatigue She has weight loss 2/2 to ADHD medication, continues to lose weight  IBS: diarrhea and constipation Has GERD.  Raynaud's (possible feet),  Has dry mouth and eyes 2/2 to meds  Patient denies joint swelling, joint erythema/warmth, morning stiffness, fatigue, fever, chills, , nasopharyngeal ulcers, chest pain, palpitations, cough, SOB, , , blood in stool,  hematuria, rash, , alopecia, dry skin, headaches, eye pain/redness, vision changes, myalgias, muscle weakness,    -    PMHx: As above PSHx: fracture right arm when she was 5 while karate  Family Hx: Denies known family history of rheumatologic conditions including RA, SLE, Sjogren's, Myositis, scleroderma, or vasculitis Social Hx:  Smoking Hx: denies EtOH Hx: rare Drug use: denies Occupation: working, 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

## 2024-04-03 NOTE — ASSESSMENT
[FreeTextEntry1] : 28 year old F with ADHD, anxiety, migraine headaches, IBS , PCOS   Following with neurology for involuntary tremor/muscle twitching in her hands . Was in the ER at [facility]  for hand tremor  and  when she was going from lying to standing  visions was blacking out. For the last 11 days, she has had hand tremors, dizziness, and worsening headaches, tinnitus.  She  tapered down her ADHD medication as per her PCP to help with hand tremors She was started on propranolol for tremors and migraines by neurologist, Also pending MRI. Started propranolol yesterday  She was referred by neurology  to rheum to evaluate hypermobility syndrome  Patient has tight shoulders, pain in the knees, pain in her upper back and sometimes in lower back.  Patient reports elbows and knee sometimes become red, hot but not necessarily painful or swollen ; worse with use.  She also has TMJ  Denies  dislocations/subluxation hx Used to get a lot of sprains in her wrists and ankles when she was younger  Patient does not have significant skin hyperextensibility She reports easy bruising and it seems like she has recently had worsening symptoms that are likely POTS; she did try a version of tilt table test on her own at home which wasnt revealing She denies  hernias, prolapse, known hx of aortic aneurysm Numbness in left big toe chronic Toes turning purple not related to temp recently. Initially she thought this was related to ice-skating however she has been taking a break and still has discoloration in her toes She has myopia  She denies epicanthal folds, subcutaneous nodules, foot deformities, muscle weakness, cavitary prolapse, pectus deformity,  retinal detachment, keratoconus, lens subluxation, hive She has chronic fatigue She has weight loss 2/2 to ADHD medication, continues to lose weight  IBS: diarrhea and constipation Has GERD.  Raynaud's (possible feet),  Has dry mouth and eyes 2/2 to meds  - Joint pain likely in the setting of hyper mobility syndrome Given joint pain possible Raynaud's and sicca symptoms  will also evaluate for underlying connective tissue disease with serologies as below although suspicion remains low given classic symptoms of hypermobility. --Her arthralgia is likely secondary to hypermobility. I have advised her to see Dr. Doc Markham for evaluation of hypermobility disorder and further workup if necessary. -- Patient will benefit from PT for joint strengthening, and advised on joint protection. She reports she has done PT for her knees in the past. - Referral to cardiology for POTS eval /echo - Continue to follow with neuro for tremors and migraines  -She should also go for regular ophthalmology screening   Will call back patient to review results of blood work once available Total time spent in review of patient history, clinical exam, management, counseling, and plan of care:  63min

## 2024-04-04 LAB
APPEARANCE: CLEAR
BILIRUBIN URINE: NEGATIVE
BLOOD URINE: NEGATIVE
C3 SERPL-MCNC: 123 MG/DL
C4 SERPL-MCNC: 30 MG/DL
CCP AB SER IA-ACNC: <8 UNITS
CENTROMERE IGG SER-ACNC: <0.2 AL
COLOR: YELLOW
CREAT SPEC-SCNC: 36 MG/DL
CREAT/PROT UR: NORMAL RATIO
CRP SERPL-MCNC: <3 MG/L
DSDNA AB SER-ACNC: 1 IU/ML
ENA RNP AB SER IA-ACNC: <0.2 AL
ENA SCL70 IGG SER IA-ACNC: <0.2 AL
ENA SM AB SER IA-ACNC: <0.2 AL
ENA SS-A AB SER IA-ACNC: <0.2 AL
ENA SS-B AB SER IA-ACNC: <0.2 AL
ERYTHROCYTE [SEDIMENTATION RATE] IN BLOOD BY WESTERGREN METHOD: 10 MM/HR
GLUCOSE QUALITATIVE U: NEGATIVE MG/DL
HBV CORE IGG+IGM SER QL: NONREACTIVE
HBV SURFACE AB SER QL: REACTIVE
HBV SURFACE AG SER QL: NONREACTIVE
HCV AB SER QL: NONREACTIVE
HCV S/CO RATIO: 0.14 S/CO
KETONES URINE: NEGATIVE MG/DL
LEUKOCYTE ESTERASE URINE: NEGATIVE
NITRITE URINE: NEGATIVE
PH URINE: 6.5
PROT UR-MCNC: <4 MG/DL
PROTEIN URINE: NEGATIVE MG/DL
RF+CCP IGG SER-IMP: NEGATIVE
RHEUMATOID FACT SER QL: <10 IU/ML
SPECIFIC GRAVITY URINE: 1.01
T4 FREE SERPL-MCNC: 1 NG/DL
THYROGLOB AB SERPL-ACNC: <20 IU/ML
THYROPEROXIDASE AB SERPL IA-ACNC: 20.6 IU/ML
TSH SERPL-ACNC: 2.92 UIU/ML
UROBILINOGEN URINE: 0.2 MG/DL

## 2024-04-05 LAB — ANA SER IF-ACNC: NEGATIVE

## 2024-04-06 LAB
M TB IFN-G BLD-IMP: NEGATIVE
QUANTIFERON TB PLUS MITOGEN MINUS NIL: >10 IU/ML
QUANTIFERON TB PLUS NIL: 0.03 IU/ML
QUANTIFERON TB PLUS TB1 MINUS NIL: 0 IU/ML
QUANTIFERON TB PLUS TB2 MINUS NIL: 0.01 IU/ML

## 2024-04-08 ENCOUNTER — NON-APPOINTMENT (OUTPATIENT)
Age: 29
End: 2024-04-08

## 2024-04-08 LAB — 14-3-3 ETA AG SER IA-MCNC: <0.2 NG/ML

## 2024-04-10 ENCOUNTER — APPOINTMENT (OUTPATIENT)
Dept: OBGYN | Facility: CLINIC | Age: 29
End: 2024-04-10
Payer: COMMERCIAL

## 2024-04-10 VITALS
WEIGHT: 151 LBS | DIASTOLIC BLOOD PRESSURE: 76 MMHG | HEIGHT: 65 IN | BODY MASS INDEX: 25.16 KG/M2 | SYSTOLIC BLOOD PRESSURE: 110 MMHG

## 2024-04-10 DIAGNOSIS — E28.2 POLYCYSTIC OVARIAN SYNDROME: ICD-10-CM

## 2024-04-10 PROCEDURE — G2211 COMPLEX E/M VISIT ADD ON: CPT

## 2024-04-10 PROCEDURE — 99213 OFFICE O/P EST LOW 20 MIN: CPT

## 2024-04-10 RX ORDER — NORETHINDRONE 0.35 MG/1
0.35 TABLET ORAL DAILY
Qty: 3 | Refills: 3 | Status: ACTIVE | COMMUNITY
Start: 2024-04-10 | End: 1900-01-01

## 2024-04-10 NOTE — DISCUSSION/SUMMARY
[FreeTextEntry1] : Discussed polycystic ovarian syndrome as constellation of symptoms with risks for endometrial hyperplasia, endometrial cancer, hyperlipidemia, diabetes, obesity and infertility.  Discussed treatment including weight loss and hormonal control.  Options such as combined estrogen-progestin discussed for cyclic endometrial shedding, progestin only hormonal control such as Depo-Provera and Levonorgestrel-IUD, and cyclic Provera discussed.  Discussed glycemic control with weight loss, nutrition and metformin.  Patient opts for POPs (h/o migraine) which will be sent to pharmacy.  Diet/exercise/weight loss and conservative management also discussed.   Pt also reports that she may have Zuly Danlos Syndrome and is on a wait list for evaluation. [facility] referral given per pt request.

## 2024-04-10 NOTE — HISTORY OF PRESENT ILLNESS
[FreeTextEntry1] : 27yo here for f/u for sono showing b/l PCO appearing ovaries and irregular menses. [No] : Patient does not have concerns regarding sex

## 2024-04-18 ENCOUNTER — APPOINTMENT (OUTPATIENT)
Dept: MRI IMAGING | Facility: CLINIC | Age: 29
End: 2024-04-18
Payer: COMMERCIAL

## 2024-04-18 ENCOUNTER — OUTPATIENT (OUTPATIENT)
Dept: OUTPATIENT SERVICES | Facility: HOSPITAL | Age: 29
LOS: 1 days | End: 2024-04-18

## 2024-04-18 DIAGNOSIS — H93.19 TINNITUS, UNSPECIFIED EAR: ICD-10-CM

## 2024-04-18 DIAGNOSIS — G43.909 MIGRAINE, UNSPECIFIED, NOT INTRACTABLE, WITHOUT STATUS MIGRAINOSUS: ICD-10-CM

## 2024-04-18 PROCEDURE — 70553 MRI BRAIN STEM W/O & W/DYE: CPT | Mod: 26

## 2024-04-18 PROCEDURE — 70553 MRI BRAIN STEM W/O & W/DYE: CPT

## 2024-04-18 PROCEDURE — A9585: CPT

## 2024-04-22 ENCOUNTER — APPOINTMENT (OUTPATIENT)
Dept: CARDIOLOGY | Facility: CLINIC | Age: 29
End: 2024-04-22
Payer: COMMERCIAL

## 2024-04-22 ENCOUNTER — NON-APPOINTMENT (OUTPATIENT)
Age: 29
End: 2024-04-22

## 2024-04-22 VITALS
HEART RATE: 73 BPM | WEIGHT: 150 LBS | TEMPERATURE: 98.6 F | SYSTOLIC BLOOD PRESSURE: 110 MMHG | BODY MASS INDEX: 24.99 KG/M2 | DIASTOLIC BLOOD PRESSURE: 68 MMHG | HEIGHT: 65 IN | OXYGEN SATURATION: 98 %

## 2024-04-22 VITALS — DIASTOLIC BLOOD PRESSURE: 84 MMHG | HEART RATE: 71 BPM | SYSTOLIC BLOOD PRESSURE: 124 MMHG

## 2024-04-22 VITALS — DIASTOLIC BLOOD PRESSURE: 72 MMHG | HEART RATE: 64 BPM | SYSTOLIC BLOOD PRESSURE: 108 MMHG

## 2024-04-22 VITALS — SYSTOLIC BLOOD PRESSURE: 110 MMHG | DIASTOLIC BLOOD PRESSURE: 72 MMHG

## 2024-04-22 DIAGNOSIS — Z78.9 OTHER SPECIFIED HEALTH STATUS: ICD-10-CM

## 2024-04-22 DIAGNOSIS — Z83.438 FAMILY HISTORY OF OTHER DISORDER OF LIPOPROTEIN METABOLISM AND OTHER LIPIDEMIA: ICD-10-CM

## 2024-04-22 DIAGNOSIS — R42 DIZZINESS AND GIDDINESS: ICD-10-CM

## 2024-04-22 DIAGNOSIS — R55 SYNCOPE AND COLLAPSE: ICD-10-CM

## 2024-04-22 DIAGNOSIS — R09.89 OTHER SPECIFIED SYMPTOMS AND SIGNS INVOLVING THE CIRCULATORY AND RESPIRATORY SYSTEMS: ICD-10-CM

## 2024-04-22 DIAGNOSIS — R68.89 OTHER GENERAL SYMPTOMS AND SIGNS: ICD-10-CM

## 2024-04-22 PROCEDURE — 99203 OFFICE O/P NEW LOW 30 MIN: CPT

## 2024-04-22 PROCEDURE — 93000 ELECTROCARDIOGRAM COMPLETE: CPT

## 2024-04-22 PROCEDURE — 99213 OFFICE O/P EST LOW 20 MIN: CPT

## 2024-04-22 NOTE — PHYSICAL EXAM

## 2024-04-22 NOTE — ASSESSMENT
[FreeTextEntry1] : EKG 4/22/2024- Sinus Bradycardia -With rate variation  cv = 10. -Negative precordial T-waves -Normal for age.  PROBABLY NORMAL FOR AGE  Assessment: 1.  Dizziness 2.  Chest pain 3.  Migraine headaches 4.  Hyperlipidemia/ADHD and other problems as noted  Recommendations: Patient was checked for orthostatic hypotension which was negative.  1.  Echocardiogram and regular stress test 2.  Holter monitor for 14 days 3.  Follow-up in 2 months with PCP

## 2024-04-22 NOTE — HISTORY OF PRESENT ILLNESS
[FreeTextEntry1] : HPI: Patient is a 28-year-old  female with a history of for migraine headaches which is treated with propranolol, history of ADHD presents for cardiac evaluation because of symptoms of dizziness and chest pain.  Patient has noted dizziness on and off for the past 1 month.  Patient has noted substernal chest pain for about few weeks, nonexertional, chest pain sometimes lasts all day constantly without any associated wheezing, patient reports nausea sensation and shortness of breath.Patient reports that she has had "presyncopal" events     PMH: No diabetes or hypertension.  No known prior cardiac issues.  Social History: Non-smoker denies any alcohol or substance abuse  Family history:Noncontributory.

## 2024-04-29 ENCOUNTER — RX RENEWAL (OUTPATIENT)
Age: 29
End: 2024-04-29

## 2024-04-29 RX ORDER — SUMATRIPTAN 100 MG/1
100 TABLET, FILM COATED ORAL
Qty: 12 | Refills: 1 | Status: ACTIVE | COMMUNITY
Start: 2024-04-01 | End: 1900-01-01

## 2024-05-03 ENCOUNTER — APPOINTMENT (OUTPATIENT)
Dept: CARDIOLOGY | Facility: CLINIC | Age: 29
End: 2024-05-03
Payer: COMMERCIAL

## 2024-05-03 PROCEDURE — 93306 TTE W/DOPPLER COMPLETE: CPT

## 2024-05-06 ENCOUNTER — APPOINTMENT (OUTPATIENT)
Dept: INTERNAL MEDICINE | Facility: CLINIC | Age: 29
End: 2024-05-06
Payer: COMMERCIAL

## 2024-05-06 VITALS
TEMPERATURE: 97.8 F | DIASTOLIC BLOOD PRESSURE: 64 MMHG | WEIGHT: 150 LBS | HEIGHT: 65 IN | OXYGEN SATURATION: 99 % | HEART RATE: 60 BPM | SYSTOLIC BLOOD PRESSURE: 106 MMHG | BODY MASS INDEX: 24.99 KG/M2
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
## 2024-05-06 DIAGNOSIS — Z91.89 OTHER SPECIFIED PERSONAL RISK FACTORS, NOT ELSEWHERE CLASSIFIED: ICD-10-CM

## 2024-05-06 PROCEDURE — G2211 COMPLEX E/M VISIT ADD ON: CPT

## 2024-05-06 PROCEDURE — 99213 OFFICE O/P EST LOW 20 MIN: CPT

## 2024-05-07 ENCOUNTER — APPOINTMENT (OUTPATIENT)
Dept: CARDIOLOGY | Facility: CLINIC | Age: 29
End: 2024-05-07

## 2024-05-08 ENCOUNTER — APPOINTMENT (OUTPATIENT)
Dept: CARDIOLOGY | Facility: CLINIC | Age: 29
End: 2024-05-08
Payer: COMMERCIAL

## 2024-05-08 PROCEDURE — 93015 CV STRESS TEST SUPVJ I&R: CPT

## 2024-05-09 ENCOUNTER — APPOINTMENT (OUTPATIENT)
Dept: UROLOGY | Facility: CLINIC | Age: 29
End: 2024-05-09
Payer: COMMERCIAL

## 2024-05-09 VITALS
HEART RATE: 88 BPM | HEIGHT: 65 IN | DIASTOLIC BLOOD PRESSURE: 82 MMHG | WEIGHT: 150 LBS | SYSTOLIC BLOOD PRESSURE: 127 MMHG | BODY MASS INDEX: 24.99 KG/M2

## 2024-05-09 DIAGNOSIS — M62.89 OTHER SPECIFIED DISORDERS OF MUSCLE: ICD-10-CM

## 2024-05-09 PROCEDURE — 99203 OFFICE O/P NEW LOW 30 MIN: CPT

## 2024-05-09 NOTE — HISTORY OF PRESENT ILLNESS
[FreeTextEntry1] : 29 yo with PMH of chronic UTIs presenting for dysuria associated with urinary urgency that started two years ago. Patient had a UA done in March which was negative however patient continues to have worsening of symptoms.  Denies hematuria, urethral and/or vaginal discharge. Admits to vaginal dryness. Reports limited intake of caffeine, currently does not drink, and never smoked.  Pt is already scheduled to start physical therapy / pelvic floor therapy next week from GYN Off note, patient is currently being worked up for hypermobility syndrome, IBS, and GERD.

## 2024-05-09 NOTE — ASSESSMENT
[FreeTextEntry1] : 29 yo presenting for dysuria. Negative UA in March.   A/P:   1) Pelvic floor dysfunction  - Recommended pelvic floor therapy - Return to office in 2-3 months   2) Possible IC  - Will discuss symptom management at the next visit if symptoms do not improve

## 2024-05-13 LAB
A PHAGOCYTOPH IGG TITR SER IF: NORMAL
B BURGDOR AB SER QL IA: 0.32 IV
B BURGDOR AB SER-IMP: NEGATIVE
B BURGDOR IGG+IGM SER QL: 0.15 INDEX
B MICROTI IGG TITR SER: NORMAL
E CHAFFEENSIS IGG TITR SER IF: NORMAL

## 2024-05-14 PROBLEM — Z91.89 RISK OF EXPOSURE TO LYME DISEASE: Status: ACTIVE | Noted: 2024-05-06
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
## 2024-05-14 NOTE — HISTORY OF PRESENT ILLNESS
[FreeTextEntry8] : SHANIQUE ZUNIGA is a 28 year F who presents today to request additional testing regarding evaluation of her complaints of tremors. She has been evaluated by the ER and both Neurology and Rheumatology. She was started on Propranolol for both her tremors and Migraines. She is not sure there has been any improvement in the Tremors. One of the clinicians advised that she consult with her PCP to r/o 'tick-borne illnesses". She is requesting blood work for tick related disease. She doesn't report any history of a tick bite.

## 2024-05-14 NOTE — PHYSICAL EXAM
[Normal] : no acute distress, well nourished, well developed and well-appearing [Normal Sclera/Conjunctiva] : normal sclera/conjunctiva [Normal Outer Ear/Nose] : the outer ears and nose were normal in appearance [Speech Grossly Normal] : speech grossly normal [Normal Mood] : the mood was normal [de-identified] : minimal tremor of outstretched upper extremities

## 2024-05-15 ENCOUNTER — APPOINTMENT (OUTPATIENT)
Dept: OTOLARYNGOLOGY | Facility: CLINIC | Age: 29
End: 2024-05-15
Payer: COMMERCIAL

## 2024-05-15 VITALS — WEIGHT: 150 LBS | HEIGHT: 65 IN | BODY MASS INDEX: 24.99 KG/M2

## 2024-05-15 DIAGNOSIS — H93.19 TINNITUS, UNSPECIFIED EAR: ICD-10-CM

## 2024-05-15 DIAGNOSIS — Z87.19 PERSONAL HISTORY OF OTHER DISEASES OF THE DIGESTIVE SYSTEM: ICD-10-CM

## 2024-05-15 DIAGNOSIS — H69.93 UNSPECIFIED EUSTACHIAN TUBE DISORDER, BILATERAL: ICD-10-CM

## 2024-05-15 DIAGNOSIS — R26.89 OTHER ABNORMALITIES OF GAIT AND MOBILITY: ICD-10-CM

## 2024-05-15 DIAGNOSIS — H90.3 SENSORINEURAL HEARING LOSS, BILATERAL: ICD-10-CM

## 2024-05-15 DIAGNOSIS — R42 DIZZINESS AND GIDDINESS: ICD-10-CM

## 2024-05-15 PROCEDURE — 99214 OFFICE O/P EST MOD 30 MIN: CPT

## 2024-05-15 PROCEDURE — 92557 COMPREHENSIVE HEARING TEST: CPT

## 2024-05-15 PROCEDURE — 92567 TYMPANOMETRY: CPT

## 2024-05-15 PROCEDURE — 99204 OFFICE O/P NEW MOD 45 MIN: CPT

## 2024-05-15 NOTE — ASSESSMENT
[FreeTextEntry1] : mri reviewed mild polypoid thickening otherwise neg exam unremarkable audio and tymp wnl migraine vertigo and imbalance  unclear etiology for vertigo rec vng

## 2024-05-15 NOTE — REVIEW OF SYSTEMS
[Seasonal Allergies] : seasonal allergies [Dizziness] : dizziness [Lightheadedness] : lightheadedness [Ear Noises] : ear noises [Patient Intake Form Reviewed] : Patient intake form was reviewed [Negative] : Ear [de-identified] : positional vertigo, feels like she is on a boat

## 2024-05-15 NOTE — PHYSICAL EXAM
[Midline] : trachea located in midline position [Normal] : no rashes [] : Romberg test is negative [de-identified] : gait steady, VOR neg, no nystag 21.3

## 2024-05-15 NOTE — HISTORY OF PRESENT ILLNESS
[facility] Medicine Services  INPATIENT PROGRESS NOTE    Patient Name: Carlo Velasco  Date of Admission: 2/17/2022  Today's Date: 02/18/22  Length of Stay: 1  Primary Care Physician: Cruz Chaudhari MD    Subjective   Chief Complaint: Follow-up  HPI   Admitted for sepsis from urinary source.  Treated in an outpatient setting with Macrobid.  Persistent fever  Dysuria, frequency and decreased urinary flow.  Personal history of prostate cancer early in his 30s    Hemodynamically stable.    From last echo reviewed, patient has normal EF.  Did not have any signs of fluid retention but appears to be volume depleted on admit.  Therefore I discontinued Lasix which was noted to be in med list today      Conclusion of cardiac catheterization    4/28/2020     Left ventricle ejection fraction 45%  Moderately elevated left ventricular end-diastolic pressure of 19 mmHg  Atretic left intramammary artery graft  Patent stents in the left anterior descending coronary artery  60% stenosis chronic of diagonal branch unchanged from prior cardiac catheterization  95% in-stent restenosis of first obtuse marginal branch treated with PTCA and then implantation of 2.5 x 12 mm Synergy stent with 0% residual stenosis  70% stenosis of distal right coronary artery  Widely patent saphenous venous graft to the distal right coronary artery     He states he feels a lot better compared yesterday.  He is urinating better  He is eating well  No nausea or vomiting  No abdominal pain    He states he had fallen 2 to 3 days ago.  He frequently has fall.  He claims to have polio as a kid.  Despite of this he was able to go to join the [employer].    History of empyema last year   Review of Systems     All pertinent negatives and positives are as above. All other systems have been reviewed and are negative unless otherwise stated.     Objective    Temp:  [97.7 °F (36.5 °C)-102.9 °F (39.4 °C)] 98.8 °F (37.1 °C)  Heart  Rate:  [] 83  Resp:  [16-20] 16  BP: (110-149)/(51-78) 110/55  Physical Exam   He actually looks better and has more color in his face.  pleasant man  no distress  hemodynamically stable  alert oriented x3  grossly nonfocal exam  Short neck no thyromegaly  obese with BMI of 39  lungs are clear to auscultation with good air exchange  S1-S2, regular rate and rhythm  obese abdomen, nontender, could examine for any organomegaly  no cyanosis or gross edema  dry skin  good capillary refill time  No point tenderness in his back  Results Review:  I have reviewed the labs, radiology results, and diagnostic studies.    Laboratory Data:   Results from last 7 days   Lab Units 02/17/22  1410   WBC 10*3/mm3 21.02*   HEMOGLOBIN g/dL 13.6   HEMATOCRIT % 42.5   PLATELETS 10*3/mm3 222        Results from last 7 days   Lab Units 02/17/22  1410   SODIUM mmol/L 137   POTASSIUM mmol/L 3.7   CHLORIDE mmol/L 101   CO2 mmol/L 26.0   BUN mg/dL 12   CREATININE mg/dL 0.79   CALCIUM mg/dL 8.7   BILIRUBIN mg/dL 0.9   ALK PHOS U/L 111   ALT (SGPT) U/L 29   AST (SGOT) U/L 28   GLUCOSE mg/dL 194*       Culture Data:   No results found for: BLOODCX, URINECX, WOUNDCX, MRSACX, RESPCX, STOOLCX    Radiology Data:   Imaging Results (Last 24 Hours)     Procedure Component Value Units Date/Time    XR Chest 1 View [[id number]] Collected: 02/17/22 1641     Updated: 02/17/22 1646    Narrative:      EXAM: XR CHEST 1 VW-     INDICATION: fever     COMPARISON: 6/2/2021     FINDINGS:     Cardiac silhouette is mildly enlarged. Prior median sternotomy. LEFT  chest wall 2-lead cardiac pacing device. Chronic mild RIGHT  hemidiaphragm elevation. Similar-appearing chronic RIGHT lower  pleural/parenchymal opacities. No new airspace opacities. No large  pleural effusion or pneumothorax. Cervical spine fusion hardware. No  acute osseous finding.       Impression:         1.  No acute findings.  2.  Chronic pleural/parenchymal opacities in the RIGHT lower chest.  This  [de-identified] : onset 6 weeks ago dizziness ie like rocking boat room spinning can be triggered w movement but not always can last seconds or can last for hours, c/o bilateral loud ring daily sometimes associated w vertigo question of  hearing loss,  numbness hads and feet Raynaud's hx hx migraine rx for migraine neuro eval had mri essential tremor  report was finalized on 02/17/2022 16:43 by Dr. Eleuterio Nieto MD.    CT Abdomen Pelvis With Contrast [[id number]] Collected: 02/17/22 1555     Updated: 02/17/22 1606    Narrative:      CT ABDOMEN PELVIS W CONTRAST- 2/17/2022 3:44 PM CST     HISTORY: Abdominal pain, acute, nonlocalized      COMPARISON: 10/19/2016     DOSE LENGTH PRODUCT: 922 mGy cm. Automated exposure control was also  utilized to decrease patient radiation dose.     TECHNIQUE: Axial images of the abdomen pelvis are obtained following IV  contrast.     FINDINGS:  Elevated right hemidiaphragm. Prior mediastinal surgery with  cardiac pacer device. Cardiomegaly. 4 mm nonspecific right lower lobe  pulmonary nodule with basilar atelectasis.     There is mild hepatic steatosis. No suspicious focal liver or splenic  mass. No pancreatic cyst or mass identified. No peripancreatic  inflammation. The gallbladder isn't remarkable. No adrenal nodule. 2  punctate nonobstructing right intrarenal stones with a 2.5 cm inferior  right renal cyst. Single punctate nonobstructing left intrarenal stone.  No enhancing renal mass. No hydronephrosis. There is abnormal bladder  wall thickening with perivesicular fat stranding. The prostate is  enlarged measuring 6.4 cm heterogeneous appearance to the prostate with  minimal fat stranding also noted along the seminal vesicles.     No free intraperitoneal air loculated abscess. No evidence for bowel  obstruction. Normal appendix. Decompressed stomach. No pathologic  lymphadenopathy. Mild vascular calcification.     Nonfused right posterior rib fractures. Postoperative changes of the  lumbar spine. Subacute versus chronic appearing T6 compression  deformity, new since the 6/2/2021 lateral chest x-ray exam.       Impression:      1. There is bladder wall thickening of the under distended bladder with  perivesicular fat stranding. Prostate remains enlarged with additional  inflammation suspected along the prostate and seminal  vesicles. Findings  concerning for a cystitis and/or prostatitis. No abscess collection.  2. Interval T6 compression deformity compared to lateral chest x-ray  6/2/2021. Nonfused old right posterior rib fractures. Postoperative  changes lumbar spine.  3. Cardiomegaly. Cardiac pacer device. Prior mediastinal surgery.  4. Nonobstructing intrarenal stones of the right renal cysts. No  hydronephrosis.  This report was finalized on 02/17/2022 16:02 by Dr. Cassie Mckeon MD.          I have reviewed the patient's current medications.     Assessment/Plan     Active Hospital Problems    Diagnosis    • UTI (urinary tract infection)      · Sepsis from urinary source (cystitis/prostatitis)  · Systemic inflammatory response syndrome (fever, tachycardia, leukocytosis) in the setting of urinary tract infection and nonobstructing intrarenal stones.  · Incidental (?)T6 compression deformity per CT imaging. Will check in the morning with neurosurgery for any further recommendation  · History of fall  · Dehydration  · Coronary artery disease with prior stents.  Last cath April 28, 2020.  · Hypertension -stable continue present management  · Hyperlipidemia -continue present management  · History of empyema last year requiring chest tube  · Personal history of polio as a child       Plan:    Continue empiric antibiotic pending culture and sensitivity from blood and urine  Labs are still pending  Stopped Lasix.  Monitor volume status.  Patient able to tolerate p.o. intake.  Patient's heart rate improved.  No pressing need for IV fluid.  Patient encouraged to drink water  Supportive care  Continue present management  Neurosurgery consult, PT OT regarding recurrent fall  Discussed with nurse    atorvastatin, 80 mg, Oral, Nightly  cefTRIAXone, 2 g, Intravenous, Q24H  cetirizine, 10 mg, Oral, Daily  clopidogrel, 75 mg, Oral, Daily  donepezil, 5 mg, Oral, Nightly  escitalopram, 20 mg, Oral, Daily  fluticasone, 2 spray, Nasal,  Daily  furosemide, 40 mg, Oral, Daily  isosorbide mononitrate, 30 mg, Oral, QAM  lisinopril, 5 mg, Oral, Daily  metoprolol tartrate, 25 mg, Oral, Q12H  pantoprazole, 40 mg, Oral, QAM  pregabalin, 150 mg, Oral, TID  sodium chloride, 10 mL, Intravenous, Q12H  sucralfate, 1 g, Oral, 4x Daily AC & at Bedtime  topiramate, 25 mg, Oral, TID            Discharge Planning: To be determined pending culture result, recommendation from neurosurgery.    Electronically signed by Juan Bee MD, 02/18/22, 08:34 CST.

## 2024-05-17 ENCOUNTER — APPOINTMENT (OUTPATIENT)
Dept: OTOLARYNGOLOGY | Facility: CLINIC | Age: 29
End: 2024-05-17
Payer: COMMERCIAL

## 2024-05-17 PROCEDURE — 92540 BASIC VESTIBULAR EVALUATION: CPT

## 2024-05-17 PROCEDURE — 92537 CALORIC VSTBLR TEST W/REC: CPT

## 2024-05-20 ENCOUNTER — LABORATORY RESULT (OUTPATIENT)
Age: 29
End: 2024-05-20

## 2024-05-20 ENCOUNTER — NON-APPOINTMENT (OUTPATIENT)
Age: 29
End: 2024-05-20

## 2024-05-20 ENCOUNTER — APPOINTMENT (OUTPATIENT)
Dept: PEDIATRIC ALLERGY IMMUNOLOGY | Facility: CLINIC | Age: 29
End: 2024-05-20
Payer: COMMERCIAL

## 2024-05-20 VITALS
BODY MASS INDEX: 24.99 KG/M2 | HEIGHT: 65 IN | SYSTOLIC BLOOD PRESSURE: 108 MMHG | DIASTOLIC BLOOD PRESSURE: 66 MMHG | HEART RATE: 72 BPM | TEMPERATURE: 97.8 F | OXYGEN SATURATION: 97 % | WEIGHT: 150 LBS

## 2024-05-20 DIAGNOSIS — R06.02 SHORTNESS OF BREATH: ICD-10-CM

## 2024-05-20 DIAGNOSIS — R51.9 HEADACHE, UNSPECIFIED: ICD-10-CM

## 2024-05-20 DIAGNOSIS — J30.2 OTHER SEASONAL ALLERGIC RHINITIS: ICD-10-CM

## 2024-05-20 DIAGNOSIS — K58.9 IRRITABLE BOWEL SYNDROME W/OUT DIARRHEA: ICD-10-CM

## 2024-05-20 PROCEDURE — 94010 BREATHING CAPACITY TEST: CPT

## 2024-05-20 PROCEDURE — 99204 OFFICE O/P NEW MOD 45 MIN: CPT | Mod: 25

## 2024-05-20 PROCEDURE — 99214 OFFICE O/P EST MOD 30 MIN: CPT | Mod: 25

## 2024-05-20 RX ORDER — LEVALBUTEROL TARTRATE 45 UG/1
45 AEROSOL, METERED ORAL
Qty: 1 | Refills: 5 | Status: ACTIVE | COMMUNITY
Start: 2024-05-20 | End: 1900-01-01

## 2024-05-20 NOTE — HISTORY OF PRESENT ILLNESS
[de-identified] : In office to be evaluated for allergies, possible food allergies, dyspnea with exertion.  Allergies: Symptoms since childhood in spring and fall consisting of itchy eyes, runny nose, postnasal drip, occasional throat tightness and cough.  Tried various antihistamines (Zyrtec, Benadryl) with partial help.  Use nasal spray and eyedrops when symptoms severe.  Had bronchitis and sinus infection in the past but does not require frequent antibiotics. Exercise-induced asthma: Since 2021, she gets pain, dryness in the chest and throat, and shortness of breath with ice-skating.  She skates intensely.  She never used an inhaler. Gastrointestinal symptoms/possible food reactions: From 2014, she had alternation of diarrhea and constipation.  She was diagnosed with IBS in 2018 and she has alternating IBS-D with IBS-C.  FODMAP diet did not help.  She also has significant nausea, burping, and acid reflux.Scheduled for colonoscopy and endoscopy in July 2024.  Never had before.  No blood or mucus in the stool. History of eczema in childhood.  She gets itchy and red skin with certain fabrics including patch Malcolm.  Also gets itchy from dryness in the shower.  Symptoms cleared spontaneously in a few days. Medications: She had bradycardia from propranolol that was started for migraines and tremors.  Those needed to be adjusted.  She had MRI with gadolinium 1 month ago and she had a rash on her right hand and wrist after the injection in the right antecubital area.  She took an antihistamine.  She also had a rash at the site of heart monitor electrode for 2 weeks now. She was recently diagnosed with hypermobility syndrome and POTS.  She has PCOS. CBC with differential was normal in January with an absolute eosinophil count of 130.  Chemistry was normal in December 2023.  She had extensive autoimmune workup that was negative in April 22024.  At the same time tickborne disease panel was negative. Current medications: Atomoxetine, Lexapro, oral contraceptive, famotidine, propranolol 40 mg, vitamin B2, fish oil, electrolyte salt pills.

## 2024-05-20 NOTE — SOCIAL HISTORY
[de-identified] : Apartment with electric forced air heating, central air conditioning, air purifier, carpet in the bedroom, 1 cat, no cigarette smoke exposure.  She never smoked.

## 2024-05-20 NOTE — REASON FOR VISIT
[Evaluation/Consultation] : an evaluation/consultation of [Allergic Rhinitis] : allergic rhinitis [To Food] : allergy to food [Shortness of Breath] : shortness of breath

## 2024-05-20 NOTE — ASSESSMENT
[FreeTextEntry1] : Allergic rhinitis/allergic conjunctivitis, seasonal: Blood work for allergies. Dyspnea on exertion: Spirometry normal.  Levalbuterol HFA, 2 puffs 20 to 60 minutes before ice-skating, repeat as needed with symptoms.  Due to tremors and palpitations, she needs levalbuterol, since albuterol may worsen tremors. Diarrhea/constipation, abdominal discomfort: Blood work for food allergies.  Follow-up with a gastroenterologist for upper and lower endoscopies. History of rash (mild) with gadolinium: Premedicate with antihistamine in case of gadolinium required. Recent diagnosis of POTS and hypermobility, gastrointestinal symptoms: Blood work for serum tryptase to assess for MCAS. Follow-up in 2 to 3 weeks for further management.

## 2024-05-20 NOTE — REVIEW OF SYSTEMS
[Eye Itching] : itchy eyes [Rhinorrhea] : rhinorrhea [Post Nasal Drip] : post nasal drip [SOB with Exertion] : dyspnea on exertion [Cough] : cough [Heartburn] : heartburn [Nausea] : nausea [Diarrhea] : diarrhea [Headache] : headache [Dizziness] : dizziness [Pruritus] : pruritus [Dry Skin] : ~L dry skin [Recurrent Sinus Infections] : no recurrent sinus infections [Recurrent Bronchitis] : no recurrent bronchitis [Recurrent Pneumonia] : no ~T recurrent pneumonia [FreeTextEntry9] : Hypermobility

## 2024-05-20 NOTE — PHYSICAL EXAM
[Alert] : alert [No Acute Distress] : no acute distress [Normal Voice/Communication] : normal voice communication [Supple] : the neck was supple [Normal S1, S2] : normal S1 and S2 [No murmur] : no murmur [Regular Rhythm] : with a regular rhythm [Normal Cervical Lymph Nodes] : cervical [Skin Intact] : skin intact  [No clubbing] : no clubbing [No Cyanosis] : no cyanosis [Alert, Awake, Oriented as Age-Appropriate] : alert, awake, oriented as age appropriate [de-identified] : Eyes clear.  Mild shiners. [de-identified] : Throat clear.  Nasal mucosa pink, mild bilateral stuffy nose, no discharge.  Tympanic membranes normal.  Bilateral frontal and maxillary tenderness. [de-identified] : Chest clear, good air entry, no wheezing or crackles. [de-identified] : Abdomen soft, no organomegaly, diffuse tenderness with palpation. [de-identified] : Mild dryness and scaling on the anterior upper chest at the site of electrode.  No other active rashes.

## 2024-05-22 ENCOUNTER — NON-APPOINTMENT (OUTPATIENT)
Age: 29
End: 2024-05-22

## 2024-05-22 ENCOUNTER — APPOINTMENT (OUTPATIENT)
Dept: GASTROENTEROLOGY | Facility: CLINIC | Age: 29
End: 2024-05-22

## 2024-05-28 ENCOUNTER — NON-APPOINTMENT (OUTPATIENT)
Age: 29
End: 2024-05-28

## 2024-05-30 ENCOUNTER — APPOINTMENT (OUTPATIENT)
Dept: OTOLARYNGOLOGY | Facility: CLINIC | Age: 29
End: 2024-05-30

## 2024-05-30 ENCOUNTER — APPOINTMENT (OUTPATIENT)
Dept: DERMATOLOGY | Facility: CLINIC | Age: 29
End: 2024-05-30
Payer: COMMERCIAL

## 2024-05-30 VITALS — HEIGHT: 65 IN | BODY MASS INDEX: 24.99 KG/M2 | WEIGHT: 150 LBS

## 2024-05-30 DIAGNOSIS — L70.0 ACNE VULGARIS: ICD-10-CM

## 2024-05-30 DIAGNOSIS — D18.01 HEMANGIOMA OF SKIN AND SUBCUTANEOUS TISSUE: ICD-10-CM

## 2024-05-30 DIAGNOSIS — D22.5 MELANOCYTIC NEVI OF TRUNK: ICD-10-CM

## 2024-05-30 PROCEDURE — 99204 OFFICE O/P NEW MOD 45 MIN: CPT

## 2024-05-30 NOTE — HISTORY OF PRESENT ILLNESS
[de-identified] : Pt. presents for skin check; c/o few spots of concern;  c/o moles-  also history of acne, treated with doxy in past under tx by Gyn for PCOS, newly diagnosed Severity:  mild   Modifying factors:  none Associated symptoms:  none Context:  no association with activity

## 2024-05-30 NOTE — PHYSICAL EXAM
[Full Body Skin Exam Performed] : performed [FreeTextEntry3] : Skin examination performed of the face, neck, trunk, arms, legs;  The patient is well, alert and oriented, pleasant and cooperative. Eyelids, conjunctivae, oral mucosa, digits and nails all normal.   No cervical adenopathy.  Normal findings include:  Multiple benign nevi were noted.  Angiomas Lentigines mild acne on lower face, with comedones  No lesions were suspicious for malignancy.

## 2024-05-30 NOTE — ASSESSMENT
[FreeTextEntry1] : Complete skin examination is negative for malignancy; Multiple new concerns were addressed and discussed. Therapeutic options and their risks and benefits; along with multiple diagnostic possibilities were discussed at length; risks and benefits of skin biopsy and/or other further study were discussed;  Continue regular exams;-- Pt. has Type I skin Follow up for TBSE in 1 year  Hx adult acne, dx with PCOS discussed spriono, will address with Gyn

## 2024-06-03 ENCOUNTER — APPOINTMENT (OUTPATIENT)
Dept: NEUROLOGY | Facility: CLINIC | Age: 29
End: 2024-06-03
Payer: COMMERCIAL

## 2024-06-03 VITALS
WEIGHT: 150 LBS | HEART RATE: 80 BPM | HEIGHT: 65 IN | SYSTOLIC BLOOD PRESSURE: 124 MMHG | DIASTOLIC BLOOD PRESSURE: 78 MMHG | BODY MASS INDEX: 24.99 KG/M2 | OXYGEN SATURATION: 97 %

## 2024-06-03 DIAGNOSIS — G43.909 MIGRAINE, UNSPECIFIED, NOT INTRACTABLE, W/OUT STATUS MIGRAINOSUS: ICD-10-CM

## 2024-06-03 DIAGNOSIS — R25.1 TREMOR, UNSPECIFIED: ICD-10-CM

## 2024-06-03 PROCEDURE — 99215 OFFICE O/P EST HI 40 MIN: CPT

## 2024-06-03 RX ORDER — GABAPENTIN 100 MG/1
100 CAPSULE ORAL
Qty: 90 | Refills: 0 | Status: ACTIVE | COMMUNITY
Start: 2024-06-03 | End: 1900-01-01

## 2024-06-03 RX ORDER — METOCLOPRAMIDE 10 MG/1
10 TABLET ORAL
Qty: 30 | Refills: 2 | Status: ACTIVE | COMMUNITY
Start: 2024-04-01 | End: 1900-01-01

## 2024-06-04 ENCOUNTER — NON-APPOINTMENT (OUTPATIENT)
Age: 29
End: 2024-06-04

## 2024-06-04 RX ORDER — UBROGEPANT 100 MG/1
100 TABLET ORAL
Qty: 16 | Refills: 2 | Status: ACTIVE | COMMUNITY
Start: 2024-06-03 | End: 1900-01-01

## 2024-06-04 NOTE — ASSESSMENT
[FreeTextEntry1] : 27 y/o F with PMHx ADHD, anxiety, IBS, migraine headaches presenting today for initial neurological evaluation. Endorsing constant BUE tremor onset 3/25, worsening migraine headaches, tinnitus, balance issues. Labwork 3/2024- CBC, CMP, TSH, T4 WNL  VNG 6/17/24 - abnormal smooth pursuit and saccades, which suggests ocular motor and/or CNS abnormality. Candi Hallpike, positional tests WNL. MRI head and IACs 4/18/24- no evidence of acute infarct, hemorrhage, midline shift. No evidence of vestibular schwannoma  Physical exam stable as previous visit, with mildly improved tremor of BUE with posture.   Based on clinical history and presentation, HAs meet criteria for migraine HAs without aura with status migrainosus. Tremor of BUE likely enhanced physiological tremor vs essential tremor.  Case discussed with supervising physician Dr. Charles - abnormal VNG can be seen in vestibular migraines   Recommendations: - MRI cervical spine to evaluate for structural abnormalities - will call pt with results - start gabapentin 100 mg TID for migraine and tremor, increase to 200 mg TID in 2nd week, then 300 mg TID in 3rd week thereafter - ok to stop propranolol per cardiology instruction before diagnostic testing - continue OTC riboflavin (vitamin B2) 400 mg daily for migraine preventive - continue OTC magnesium 400 mg daily for migraine preventive, hold for diarrhea - continue reglan 10 mg PRN for n/v - discontinue sumatriptan for abortive - start ubrelvy 100 mg for abortive, take 1 tab at onset of HA, may repeat in 2h, no more than 2 tab in 24 h - pending neuro-ophthalmology eval  Patient to return to office in 3 months, or sooner if needed. Patient understands to seek urgent medical evaluation for any new or worsening symptoms.

## 2024-06-04 NOTE — PHYSICAL EXAM
[FreeTextEntry1] : NEURO: Mental Status Oriented to person, place, time, and situation Speech is clear, fluent, and spontaneous. Comprehension and memory intact  Cranial Nerves Visual fields full to confrontation Pupils equal, round, reactive to light. Wearing sunglasses indoors. photophobia. Extraocular movements intact. No nystagmus or ptosis. Facial sensation intact and symmetric in V1, V2, V3 Facial movement intact and symmetric Uvula midline, soft palate elevates normally Bilateral shoulder shrug intact Tongue midline, no tongue bite sign or deviation on protrusion  Motor Tone and bulk intact Shoulder abduction: 5/5 b/l Elbow flexion/extension: 5/5 bl Hand : 5/5 b/l Hip flexion/extension: 5/5 b/l Knee flexion/extension: 5/5 b/l Dorsiflexion/plantar flexion: 5/5 b/l No pronator drift  Sensation Light touch grossly intact  Deep Tendon Reflexes Biceps 2+ b/l Brachioradialis 2+ b/l Patellar 2+ b/l  Coordination Normal finger to nose bilaterally No past pointing Fine postural tremor BUE, improved   Gait Normal stance, stride, and pivot turn Tandem walk intact Negative Romberg.   GEN: awake, alert, interactive, no acute distress EYES: sclera white, conjunctiva clear, no redness or discharge ENT: normal appearing outer ears, hearing grossly intact PULM: normal respiratory rhythm and effort, speaking in full sentences without distress, no accessory muscle usage EXT: moving all extremities spontaneously, no edema, no cyanosis SKIN: warm, dry, no lesions on visualized skin

## 2024-06-04 NOTE — HISTORY OF PRESENT ILLNESS
[FreeTextEntry1] : Flora Poe is a 28 year old female with medical history significant for ADHD, anxiety, IBS, and migraine headaches presenting today for follow up.  Since last visit, her migraine headaches have improved in intensity. They are still occurring every day described as a band of throbbing pain over eyebrow area. She self increased her propranolol to 40 mg since she noticed worsening HAs on the lower 20 mg dose. . She is taking B2 and magnesium consistently. The reglan is helpful with the nausea. The Sumatriptan does not help consistently. The tremor has improved on propranolol, but still present. However, she has had to stop and restart her propranolol multiple times for VNG and cardiac testing, so she is wanting to switch to another medication that can treat both her migraines and tremor at this time until testing is completed. She is waiting to see a rheumatologist who specializes in hypermobility syndromes. Her eye floaters are worsening, described as a visual snow - she has appt with neuro-ophthalmologist later this month. She is also endorsing continued neck pain and stiffness. She has to take a break from figure skating at this time.  VNG 6/17/24 - abnormal smooth pursuit and saccades, which suggests ocular motor and/or CNS abnormality. Candi Hallpike, positional tests WNL. MRI head and IACs 4/18/24- no evidence of acute infarct, hemorrhage, midline shift. No evidence of vestibular schwannoma  ----------- Per chart note 4/26/24: "Spoke to pt. She takes propranolol 40 mg in daytime Rx on 4/01, in the past week she has been experiencing dizziness with position changes, HR in 50s at recent doctor visits. Advised pt to switch from morning to nighttime dosing, can cut down to 1/2 tablet for TDD 20 mg instead of 1 full tab. She will call back if still intolerable SE. All q's answered to best of my ability. Per chart review she is undergoing cardiac w/u "  Per initial visit 4/01/24: "Endorsing constant BUE tremor onset 3/25, worsening migraine headaches, tinnitus, balance issues. Labwork 3/2024- CBC, CMP, TSH, T4 WNL Physical exam with mild temporal tenderness to palpation, neck spasm b/l, palpable TMJ crepitus. Tremor BUE with posture. Beighton score 5/10 - hyperflexibility of b/l pinkies, b/l elbows, able to flex spine and touch floor Based on clinical history and presentation, HAs meet criteria for migraine HAs without aura with status migrainosus. Tremor of BUE likely enhanced physiological tremor vs essential tremor."

## 2024-06-05 ENCOUNTER — APPOINTMENT (OUTPATIENT)
Dept: NEUROSURGERY | Facility: CLINIC | Age: 29
End: 2024-06-05
Payer: COMMERCIAL

## 2024-06-05 VITALS
BODY MASS INDEX: 24.99 KG/M2 | WEIGHT: 150 LBS | HEIGHT: 65 IN | SYSTOLIC BLOOD PRESSURE: 116 MMHG | HEART RATE: 84 BPM | DIASTOLIC BLOOD PRESSURE: 63 MMHG | TEMPERATURE: 98.3 F | OXYGEN SATURATION: 97 %

## 2024-06-05 PROCEDURE — 99213 OFFICE O/P EST LOW 20 MIN: CPT

## 2024-06-05 PROCEDURE — 99203 OFFICE O/P NEW LOW 30 MIN: CPT

## 2024-06-10 ENCOUNTER — NON-APPOINTMENT (OUTPATIENT)
Age: 29
End: 2024-06-10

## 2024-06-10 ENCOUNTER — APPOINTMENT (OUTPATIENT)
Dept: OPHTHALMOLOGY | Facility: CLINIC | Age: 29
End: 2024-06-10
Payer: COMMERCIAL

## 2024-06-10 PROCEDURE — 92012 INTRM OPH EXAM EST PATIENT: CPT

## 2024-06-11 ENCOUNTER — OUTPATIENT (OUTPATIENT)
Dept: OUTPATIENT SERVICES | Facility: HOSPITAL | Age: 29
LOS: 1 days | End: 2024-06-11
Payer: COMMERCIAL

## 2024-06-11 ENCOUNTER — APPOINTMENT (OUTPATIENT)
Dept: MRI IMAGING | Facility: CLINIC | Age: 29
End: 2024-06-11
Payer: COMMERCIAL

## 2024-06-11 DIAGNOSIS — Z00.8 ENCOUNTER FOR OTHER GENERAL EXAMINATION: ICD-10-CM

## 2024-06-11 PROCEDURE — 72141 MRI NECK SPINE W/O DYE: CPT

## 2024-06-11 PROCEDURE — 72141 MRI NECK SPINE W/O DYE: CPT | Mod: 26

## 2024-06-12 RX ORDER — RIMEGEPANT SULFATE 75 MG/75MG
75 TABLET, ORALLY DISINTEGRATING ORAL
Qty: 16 | Refills: 2 | Status: ACTIVE | COMMUNITY
Start: 2024-06-12 | End: 1900-01-01

## 2024-06-13 ENCOUNTER — APPOINTMENT (OUTPATIENT)
Dept: CARDIOLOGY | Facility: CLINIC | Age: 29
End: 2024-06-13
Payer: COMMERCIAL

## 2024-06-13 ENCOUNTER — NON-APPOINTMENT (OUTPATIENT)
Age: 29
End: 2024-06-13

## 2024-06-13 VITALS
HEART RATE: 95 BPM | DIASTOLIC BLOOD PRESSURE: 78 MMHG | BODY MASS INDEX: 24.96 KG/M2 | OXYGEN SATURATION: 98 % | SYSTOLIC BLOOD PRESSURE: 120 MMHG | WEIGHT: 150 LBS

## 2024-06-13 DIAGNOSIS — R07.9 CHEST PAIN, UNSPECIFIED: ICD-10-CM

## 2024-06-13 DIAGNOSIS — R42 DIZZINESS AND GIDDINESS: ICD-10-CM

## 2024-06-13 DIAGNOSIS — E78.5 HYPERLIPIDEMIA, UNSPECIFIED: ICD-10-CM

## 2024-06-13 PROCEDURE — 99204 OFFICE O/P NEW MOD 45 MIN: CPT | Mod: 25

## 2024-06-13 PROCEDURE — 93000 ELECTROCARDIOGRAM COMPLETE: CPT

## 2024-06-13 NOTE — REVIEW OF SYSTEMS
[Negative] : Heme/Lymph [SOB] : shortness of breath [Chest Discomfort] : chest discomfort [Palpitations] : palpitations

## 2024-06-13 NOTE — HISTORY OF PRESENT ILLNESS
[FreeTextEntry1] : Pt is a 27 y/o F who is referred here today by their PCP for evaluation. She has PMH HLD with high HDL,  migraines treated with propranolol, ADHD.  She has multiple complaints today - she has been experiencing dizziness and CP - occurs with exertion and at rest, can last up to 30min.  She also reports nausea and SOB.  She tells me that she has "exercise intolerance".  She denies diaphoresis, palpitations, dizziness, syncope, LE edema, PND, orthopnea.  She stopped propranolol on her own and has felt worse  ETT 05/2024 no ischemic changes, 10.1 METS TTE 05/2024 EF 65-70%, trace TR langston 04/2024 NSR wnl, avg HR 82   additional PMH: ADHD, GERD Previous surgeries: arm surgery - no problems with anesthesia Family hx: no SCD, no cardiac disease Smoking status: social ETOH no drug use Current exercise: 4-5x/week treadmill, weight training  Daily water intake: 60-80oz Daily caffeine intake: 1 cup coffee OTC medications: B12, fish oil, salt tab, MVI

## 2024-06-13 NOTE — DISCUSSION/SUMMARY
[EKG obtained to assist in diagnosis and management of assessed problem(s)] : EKG obtained to assist in diagnosis and management of assessed problem(s) [FreeTextEntry1] : Pt is a 27 y/o F PMH HLD with high HDL, migraines treated with propranolol, ADHD.  She has multiple complaints today - she has been experiencing dizziness and CP - occurs with exertion and at rest, can last up to 30min.  She also reports nausea and SOB.  She tells me that she has "exercise intolerance".  She denies diaphoresis, palpitations, dizziness, syncope, LE edema, PND, orthopnea.  She stopped propranolol on her own and has felt worse  restart propranolol as this helped with symptoms - can consider increasing PRN advised to stay well hydrated regular exercise cardiac testing has been normal: ETT 05/2024 no ischemic changes, 10.1 METS TTE 05/2024 EF 65-70%, trace TR langston 04/2024 NSR wnl, avg HR 82  Pt will return in 6 mnths or sooner as needed but I encouraged communication via phone or portal if necessary.  We will call pt with test results when applicable and arrange for expedited follow up if necessary.  Most recent available lab results were reviewed with pt. The described plan was discussed with the pt.  All questions and concerns were addressed to the best of my knowledge.

## 2024-06-17 ENCOUNTER — RESULT REVIEW (OUTPATIENT)
Age: 29
End: 2024-06-17

## 2024-06-18 ENCOUNTER — APPOINTMENT (OUTPATIENT)
Dept: RADIOLOGY | Facility: CLINIC | Age: 29
End: 2024-06-18
Payer: COMMERCIAL

## 2024-06-18 ENCOUNTER — OUTPATIENT (OUTPATIENT)
Dept: OUTPATIENT SERVICES | Facility: HOSPITAL | Age: 29
LOS: 1 days | End: 2024-06-18
Payer: COMMERCIAL

## 2024-06-18 DIAGNOSIS — M54.2 CERVICALGIA: ICD-10-CM

## 2024-06-18 PROCEDURE — 72052 X-RAY EXAM NECK SPINE 6/>VWS: CPT | Mod: 26

## 2024-06-18 PROCEDURE — 72052 X-RAY EXAM NECK SPINE 6/>VWS: CPT

## 2024-06-19 NOTE — ASSESSMENT
[FreeTextEntry1] : Ms. Poe presents with above history.  She is neurologically intact.  She has complaints of neck pain and neck range of motion difficulty and concern of hypermobility syndrome.  I like to review her MRI of the cervical spine and determine if we can do an x-ray cervical spine with flexion-extension to assess of any evidence of cervical instability.  And she will see us back after for review In the interim, continue with neurology Patient has been given an opportunity to ask and have their questions answered to their satisfaction. Patient knows to call the office if there are any new or worsening symptoms..end     I, Dr. Vernon Westbrook, personally performed the evaluation and management (E/M) services for this new patient who presents today. That E/M includes conducting the clinically appropriate interval history &/or exam and establishing a continued plan of care. Today, my JESSICA, Yesenia Dumont, was here to observe my evaluation and management service for this patient and follow the plan of care established by me going forward.

## 2024-06-19 NOTE — HISTORY OF PRESENT ILLNESS
[de-identified] : SHANIQUE ZUNIGA is a 28 year old female presents for initial neurosurgical evaluation of neck pain and hypermobility syndrome.  Past medical history includes migraines IBS anxiety depression and ADHD.  Patient mentions she has headaches but now is having worsening neck pain and involving the bilateral trapezius and tenderness with range of motion Patient was referred by neurology for further evaluation for concern of hypermobility syndrome of the cervical spine.  She states she has pain with neck range of motion.  No unexplained falls.  She has not had any previous surgeries dedicated the cervical spine.  She is here for neurosurgical evaluation

## 2024-06-19 NOTE — PHYSICAL EXAM
[General Appearance - Alert] : alert [General Appearance - In No Acute Distress] : in no acute distress [Oriented To Time, Place, And Person] : oriented to person, place, and time [Impaired Insight] : insight and judgment were intact [Affect] : the affect was normal [Person] : oriented to person [Place] : oriented to place [Time] : oriented to time [Short Term Intact] : short term memory intact [Remote Intact] : remote memory intact [Span Intact] : the attention span was normal [Concentration Intact] : normal concentrating ability [Fluency] : fluency intact [Comprehension] : comprehension intact [Current Events] : adequate knowledge of current events [Past History] : adequate knowledge of personal past history [Vocabulary] : adequate range of vocabulary [Cranial Nerves Optic (II)] : visual acuity intact bilaterally,  pupils equal round and reactive to light [Cranial Nerves Oculomotor (III)] : extraocular motion intact [Cranial Nerves Trigeminal (V)] : facial sensation intact symmetrically [Cranial Nerves Facial (VII)] : face symmetrical [Cranial Nerves Vestibulocochlear (VIII)] : hearing was intact bilaterally [Cranial Nerves Glossopharyngeal (IX)] : tongue and palate midline [Cranial Nerves Accessory (XI - Cranial And Spinal)] : head turning and shoulder shrug symmetric [Cranial Nerves Hypoglossal (XII)] : there was no tongue deviation with protrusion [Motor Tone] : muscle tone was normal in all four extremities [Motor Strength] : muscle strength was normal in all four extremities [No Muscle Atrophy] : normal bulk in all four extremities [Sensation Tactile Decrease] : light touch was intact [Abnormal Walk] : normal gait [Balance] : balance was intact [2+] : Patella left 2+ [Past-pointing] : there was no past-pointing [Tremor] : no tremor present [FreeTextEntry8] : Physical exam with mild temporal tenderness to palpation, neck spasm b/l, palpable TMJ crepitus. Tremor BUE with posture. Beighton score 5/10 - hyperflexibility of b/l pinkies, b/l elbows, able to flex spine and touch floor

## 2024-06-20 ENCOUNTER — NON-APPOINTMENT (OUTPATIENT)
Age: 29
End: 2024-06-20

## 2024-06-24 ENCOUNTER — APPOINTMENT (OUTPATIENT)
Dept: CARDIOLOGY | Facility: CLINIC | Age: 29
End: 2024-06-24

## 2024-06-24 RX ORDER — PROPRANOLOL HYDROCHLORIDE 40 MG/1
40 TABLET ORAL
Qty: 90 | Refills: 1 | Status: ACTIVE | COMMUNITY
Start: 2024-04-01 | End: 1900-01-01

## 2024-07-01 ENCOUNTER — APPOINTMENT (OUTPATIENT)
Dept: NEUROSURGERY | Facility: CLINIC | Age: 29
End: 2024-07-01
Payer: COMMERCIAL

## 2024-07-01 VITALS
TEMPERATURE: 98.1 F | SYSTOLIC BLOOD PRESSURE: 112 MMHG | HEIGHT: 65 IN | OXYGEN SATURATION: 99 % | HEART RATE: 82 BPM | BODY MASS INDEX: 24.99 KG/M2 | DIASTOLIC BLOOD PRESSURE: 76 MMHG | WEIGHT: 150 LBS

## 2024-07-01 PROBLEM — M35.7 HYPERMOBILITY SYNDROME: Status: ACTIVE | Noted: 2024-04-01

## 2024-07-01 PROBLEM — M54.2 CERVICALGIA: Status: ACTIVE | Noted: 2024-06-03

## 2024-07-01 PROBLEM — R51.9 INTRACTABLE HEADACHE, UNSPECIFIED CHRONICITY PATTERN, UNSPECIFIED HEADACHE TYPE: Status: ACTIVE | Noted: 2024-03-27

## 2024-07-01 PROCEDURE — 99214 OFFICE O/P EST MOD 30 MIN: CPT

## 2024-07-10 ENCOUNTER — APPOINTMENT (OUTPATIENT)
Dept: OBGYN | Facility: CLINIC | Age: 29
End: 2024-07-10
Payer: COMMERCIAL

## 2024-07-10 VITALS
WEIGHT: 151 LBS | BODY MASS INDEX: 25.16 KG/M2 | HEIGHT: 65 IN | SYSTOLIC BLOOD PRESSURE: 100 MMHG | DIASTOLIC BLOOD PRESSURE: 70 MMHG

## 2024-07-10 DIAGNOSIS — E28.2 POLYCYSTIC OVARIAN SYNDROME: ICD-10-CM

## 2024-07-10 PROCEDURE — 99213 OFFICE O/P EST LOW 20 MIN: CPT

## 2024-07-10 RX ORDER — NAPROXEN 500 MG/1
500 TABLET ORAL
Qty: 60 | Refills: 3 | Status: ACTIVE | COMMUNITY
Start: 2024-07-10 | End: 1900-01-01

## 2024-07-18 ENCOUNTER — NON-APPOINTMENT (OUTPATIENT)
Age: 29
End: 2024-07-18

## 2024-07-19 ENCOUNTER — APPOINTMENT (OUTPATIENT)
Dept: NEUROLOGY | Facility: CLINIC | Age: 29
End: 2024-07-19

## 2024-07-19 VITALS
HEART RATE: 73 BPM | HEIGHT: 65 IN | BODY MASS INDEX: 24.99 KG/M2 | SYSTOLIC BLOOD PRESSURE: 118 MMHG | WEIGHT: 150 LBS | OXYGEN SATURATION: 98 % | DIASTOLIC BLOOD PRESSURE: 78 MMHG

## 2024-07-19 DIAGNOSIS — R51.9 HEADACHE, UNSPECIFIED: ICD-10-CM

## 2024-07-19 DIAGNOSIS — G43.909 MIGRAINE, UNSPECIFIED, NOT INTRACTABLE, W/OUT STATUS MIGRAINOSUS: ICD-10-CM

## 2024-07-19 DIAGNOSIS — G25.0 ESSENTIAL TREMOR: ICD-10-CM

## 2024-07-19 DIAGNOSIS — H51.11 CONVERGENCE INSUFFICIENCY: ICD-10-CM

## 2024-07-19 DIAGNOSIS — M54.2 CERVICALGIA: ICD-10-CM

## 2024-07-19 DIAGNOSIS — H93.19 TINNITUS, UNSPECIFIED EAR: ICD-10-CM

## 2024-07-19 PROCEDURE — 99215 OFFICE O/P EST HI 40 MIN: CPT

## 2024-07-19 PROCEDURE — 99205 OFFICE O/P NEW HI 60 MIN: CPT

## 2024-07-23 ENCOUNTER — APPOINTMENT (OUTPATIENT)
Dept: RHEUMATOLOGY | Facility: CLINIC | Age: 29
End: 2024-07-23

## 2024-07-23 VITALS
SYSTOLIC BLOOD PRESSURE: 120 MMHG | BODY MASS INDEX: 24.99 KG/M2 | DIASTOLIC BLOOD PRESSURE: 83 MMHG | HEIGHT: 65 IN | HEART RATE: 105 BPM | TEMPERATURE: 97.6 F | WEIGHT: 150 LBS | OXYGEN SATURATION: 96 %

## 2024-07-23 DIAGNOSIS — G90.A POSTURAL ORTHOSTATIC TACHYCARDIA SYNDROME [POTS]: ICD-10-CM

## 2024-07-23 DIAGNOSIS — H52.209 MYOPIA, UNSPECIFIED EYE: ICD-10-CM

## 2024-07-23 DIAGNOSIS — M62.89 OTHER SPECIFIED DISORDERS OF MUSCLE: ICD-10-CM

## 2024-07-23 DIAGNOSIS — M26.609 UNSPECIFIED TEMPOROMANDIBULAR JOINT DISORDER: ICD-10-CM

## 2024-07-23 DIAGNOSIS — H52.10 MYOPIA, UNSPECIFIED EYE: ICD-10-CM

## 2024-07-23 DIAGNOSIS — Q79.62 HYPERMOBILE EHLERS-DANLOS SYNDROME: ICD-10-CM

## 2024-07-23 DIAGNOSIS — M25.50 PAIN IN UNSPECIFIED JOINT: ICD-10-CM

## 2024-07-23 DIAGNOSIS — K58.9 IRRITABLE BOWEL SYNDROME W/OUT DIARRHEA: ICD-10-CM

## 2024-07-23 DIAGNOSIS — M24.49: ICD-10-CM

## 2024-07-23 DIAGNOSIS — M35.7 HYPERMOBILITY SYNDROME: ICD-10-CM

## 2024-07-23 DIAGNOSIS — I73.00 RAYNAUD'S SYNDROME W/OUT GANGRENE: ICD-10-CM

## 2024-07-23 PROCEDURE — 36415 COLL VENOUS BLD VENIPUNCTURE: CPT

## 2024-07-23 PROCEDURE — 99417 PROLNG OP E/M EACH 15 MIN: CPT

## 2024-07-23 PROCEDURE — G2212 PROLONG OUTPT/OFFICE VIS: CPT

## 2024-07-23 PROCEDURE — 99215 OFFICE O/P EST HI 40 MIN: CPT

## 2024-07-23 RX ORDER — PROPRANOLOL HYDROCHLORIDE 40 MG/1
40 TABLET ORAL
Refills: 0 | Status: DISCONTINUED | COMMUNITY

## 2024-07-24 ENCOUNTER — TRANSCRIPTION ENCOUNTER (OUTPATIENT)
Age: 29
End: 2024-07-24

## 2024-07-24 LAB
TOTAL IGE SMQN RAST: 26 KU/L
TRYPTASE: 5.3 UG/L

## 2024-07-25 ENCOUNTER — APPOINTMENT (OUTPATIENT)
Dept: NEUROLOGY | Facility: CLINIC | Age: 29
End: 2024-07-25

## 2024-07-26 LAB
CREATININE, URINE - PER VOLUME: 29 MG/DL
HISTAMINE BLD-MCNC: 0.43 NG/ML
HISTAMINE, URINE - PER VOLUME: 193 NMOL/L
HISTAMINE/CREAT 24H UR-SRTO: 666

## 2024-07-29 ENCOUNTER — NON-APPOINTMENT (OUTPATIENT)
Age: 29
End: 2024-07-29

## 2024-07-31 ENCOUNTER — TRANSCRIPTION ENCOUNTER (OUTPATIENT)
Age: 29
End: 2024-07-31

## 2024-07-31 LAB
2,3-DINOR-11B-PROSTAGLANDIN F2A, RANDOM URINE: <1079 PG/MG CR
CREATININE RANDOM URINE: 16 MG/DL
CREATININE RANDOM URINE: 29 MG/DL
LEUKOTRIENE E4, URINE: NORMAL PG/MG CR
TITIN AB [UNITS/VOLUME] IN SERUM BY IMMUNOASSAY: NORMAL

## 2024-08-05 ENCOUNTER — APPOINTMENT (OUTPATIENT)
Dept: MRI IMAGING | Facility: CLINIC | Age: 29
End: 2024-08-05

## 2024-08-05 ENCOUNTER — OUTPATIENT (OUTPATIENT)
Dept: OUTPATIENT SERVICES | Facility: HOSPITAL | Age: 29
LOS: 1 days | End: 2024-08-05
Payer: COMMERCIAL

## 2024-08-05 DIAGNOSIS — R51.9 HEADACHE, UNSPECIFIED: ICD-10-CM

## 2024-08-05 DIAGNOSIS — R42 DIZZINESS AND GIDDINESS: ICD-10-CM

## 2024-08-05 PROCEDURE — 70544 MR ANGIOGRAPHY HEAD W/O DYE: CPT

## 2024-08-05 PROCEDURE — 70544 MR ANGIOGRAPHY HEAD W/O DYE: CPT | Mod: 26

## 2024-08-06 ENCOUNTER — TRANSCRIPTION ENCOUNTER (OUTPATIENT)
Age: 29
End: 2024-08-06

## 2024-08-06 ENCOUNTER — APPOINTMENT (OUTPATIENT)
Dept: INTERNAL MEDICINE | Facility: CLINIC | Age: 29
End: 2024-08-06

## 2024-08-06 PROBLEM — Z87.898 HISTORY OF DIZZINESS: Status: RESOLVED | Noted: 2024-04-22 | Resolved: 2024-08-06

## 2024-08-06 PROBLEM — Z90.49 S/P APPENDECTOMY: Status: ACTIVE | Noted: 2024-08-06

## 2024-08-06 PROBLEM — R09.89 AIR HUNGER: Status: RESOLVED | Noted: 2024-04-22 | Resolved: 2024-08-06

## 2024-08-06 PROBLEM — Z87.898 HISTORY OF CHEST PAIN: Status: RESOLVED | Noted: 2024-04-22 | Resolved: 2024-08-06

## 2024-08-06 PROCEDURE — 99495 TRANSJ CARE MGMT MOD F2F 14D: CPT | Mod: 95

## 2024-08-09 ENCOUNTER — TRANSCRIPTION ENCOUNTER (OUTPATIENT)
Age: 29
End: 2024-08-09

## 2024-08-30 ENCOUNTER — APPOINTMENT (OUTPATIENT)
Dept: NEUROLOGY | Facility: CLINIC | Age: 29
End: 2024-08-30
Payer: COMMERCIAL

## 2024-08-30 VITALS
SYSTOLIC BLOOD PRESSURE: 115 MMHG | HEIGHT: 65 IN | BODY MASS INDEX: 25.83 KG/M2 | HEART RATE: 88 BPM | DIASTOLIC BLOOD PRESSURE: 82 MMHG | OXYGEN SATURATION: 98 % | WEIGHT: 155 LBS

## 2024-08-30 DIAGNOSIS — Q79.62 HYPERMOBILE EHLERS-DANLOS SYNDROME: ICD-10-CM

## 2024-08-30 DIAGNOSIS — G43.909 MIGRAINE, UNSPECIFIED, NOT INTRACTABLE, W/OUT STATUS MIGRAINOSUS: ICD-10-CM

## 2024-08-30 DIAGNOSIS — M24.20 DISORDER OF LIGAMENT, UNSPECIFIED SITE: ICD-10-CM

## 2024-08-30 PROCEDURE — 99214 OFFICE O/P EST MOD 30 MIN: CPT

## 2024-08-30 NOTE — REVIEW OF SYSTEMS
[Eyesight Problems] : eyesight problems [Heart Rate Is Fast] : fast heart rate [Palpitations] : palpitations [SOB on Exertion] : shortness of breath during exertion [Constipation] : constipation [Joint Pain] : joint pain [Neck Pain] : neck pain [Lower Back Pain] : lower back pain [Skin Lesions] : skin lesion [As Noted in HPI] : as noted in HPI [Hot Flashes] : hot flashes [Muscle Weakness] : muscle weakness [Negative] : Heme/Lymph

## 2024-08-30 NOTE — PHYSICAL EXAM
[FreeTextEntry1] :     GENERAL PHYSICAL EXAM: GEN: no acute distress, normal affect EYES:  sclera white, conjunctiva clear PULM: no respiratory distress, normal rate EXT: no edema, no cyanosis Cranial:  + supraorbital, temporal, no occipital region tenderness. bilateral popping or clicking of TMJ Neck: Normal ROM, bilateral trapezius muscle spasm, tenderness SKIN: warm, dry, no rash or lesion on exposed skin   NEUROLOGICAL EXAM: Mental Status Orientation: alert and oriented to person, place, time, and situation Language: clear and fluent, intact comprehension and repetition   Cranial Nerves II: visual fields full to confrontation III, IV, VI:  PERRL, EOMI, VFF, no nystagmus, no ptosis V, VII: facial sensation and movement intact and symmetric VIII: hearing intact to finger rub bilat IX, X: uvula midline, soft palate elevates normally XI: BL shoulder shrug intact, lateral head movement 5/5 bilat XII: tongue midline   Motor: Bilateral muscle strength 5/5 in UE and LE, proximally and distally, symmetric throughout Tone and bulk are normal in upper and lower limbs. No abnormal movements   Fine tremor of bilateral hands and head. Sensation: Intact to light touch and temp in all 4 EXTs,   Coordination: Normal FNF bilateral, Negative Romberg   Reflex: 2+ in BL biceps, brachioradialis, triceps, patella, Achilles. Babinski absent bilat   Gait Normal stance, stride, and pivot turn, Tandem walk intact

## 2024-08-30 NOTE — HISTORY OF PRESENT ILLNESS
[FreeTextEntry1] : Since last visit: She has had more symptoms of her POTS and dysautonomia, tachycardia, tremor, clumsiness. She had an appointment with Dr. Cordon, ENT surgery in [place] to follow up with the Eagles syndrome.  She had to have emergency appendix removed last month.  Nurtec is helpful  >3/4 of the time. She has a breakthrough headache about once a week.  Will have some general head pressure, worse with bending over , cleaning cats litter box. She notices her tremor is worsening in her hands.  Current meds: propranolol 40mg BID, Nurtec 75 mg PRN  30 y/o woman hx of vestibular migraine with visual snow, craniocervical instability, hypermobility syndrome, tremor. She was previously being seen by Nabila BRIZUELA. She was recently seen by Dr. 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 She just started eye therapy. She had evaluation with neurosurgery, and is going for specialist opinion with Dr. Quiroz in [facility]. She is getting evaluated for connective tissue disorder next week. Her symptoms all started in March of 2023, including double vision, tremors, tinnitus, daily headaches, imbalance which as improved. She also has exercise intolerance, even though she was very active as a , tachycardia, SOB, going from sitting to standing will feel lightheaded and vision will go dark. Managing a bit with keeping hydrated and wearing compression stockings. Other symptoms include muscle weakness, muscle twitching, pain across the neck and shoulders, mostly on the left, sore throat.   Location: frontal, above the eyebrows, sinuses, upper neck, upper mid back Quality: stabbing "with a pencil", pressure pushing down from the top Intensity: 5-6/10, lowest 3/10 Disability MIDAS 15 moderate disability Duration: constant Frequency: daily, constant Time of day: Associated symptoms: visual snow and floater, bilateral tinnitus, vertigo, light sensitivity Exacerbating factors: Relieving factors: wearing shades, lying down Triggers: intense exercise, prolonged screen time, heat and humidity, extreme weather Motion sickness since she was a kid Headache treated with: Nurtec 75mg- so far effective, propranolol 40mg qd- recommended to go up to 80 by cardiology  Prior medications: sumatriptan, rizatriptan, Ubrelvy- severe nausea, propranolol 40mg BID, gabapentin 200mg daily.  She is currently doing PT for hypermobility and EDS and TMJ  Imaging: MRI cervical spine 6/11/24: mild degenerative changes. MRI brain and IAC 4/18/24: No evidence of vestibular schwannoma. Personally reveiwed.  No evidence for acute territorial infarct, acute intracranial hemorrhage, or midline shift.

## 2024-08-30 NOTE — ASSESSMENT
[FreeTextEntry1] : 30 y/o woman with hx of migraine with vestibular symptoms, visual snow, hypermobility syndrome, suspected POTS. Many of symptoms are likely related to connective tissues, some could be migrainous phenomenon. 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   Workup  Consult with Surgeon  Dr. Crowder Sept 5th  Discussed doing tilt table testing. Would hold off as this might be confounded by the Eagle's syndrome   Acute: continue Nurtec as needed.    Prevention: 0mg BID, magnesium 400mg,  Riboflavin 500mg - Talk to her cardiologist about upping dose to 60mg BID since she is having more tremor.  Would hold off TCA and topiramate since these will interfere with her ability to perform her work and she already has significant fatigue and tachycardia.  Will consider monoclonal ab after she sees the connective tissue disorder specialist.  Would also consider doing trigger point/ nerve blocks. She will speak to her physical therapist first.    Advised to keep headache journal to track headache frequency, triggers, and response to treatment.   Discussed common side effects of prescribed medications and potential alternatives.    Counseled patient on the importance of maintaining a healthy lifestyle, including balanced diet, adequate hydration, stress management, proper sleep hygiene, and physical activity.   No pregnancy planning at this time.     Answered all questions and concerns to the best of my ability. Advised to call for any new or worsening symptoms.    In order to maintain continuity of care/prescription refills, patients must be seen on a yearly basis.   Total time spent on the day of the visit, including pre-visit and post-visit time was 32 minutes.     Follow up  end of October

## 2024-09-03 ENCOUNTER — APPOINTMENT (OUTPATIENT)
Dept: NEUROLOGY | Facility: CLINIC | Age: 29
End: 2024-09-03

## 2024-09-18 ENCOUNTER — TRANSCRIPTION ENCOUNTER (OUTPATIENT)
Age: 29
End: 2024-09-18

## 2024-09-25 ENCOUNTER — APPOINTMENT (OUTPATIENT)
Dept: NUTRITION | Facility: CLINIC | Age: 29
End: 2024-09-25

## 2024-09-25 PROCEDURE — 97802 MEDICAL NUTRITION INDIV IN: CPT | Mod: 95

## 2024-10-01 ENCOUNTER — TRANSCRIPTION ENCOUNTER (OUTPATIENT)
Age: 29
End: 2024-10-01

## 2024-10-03 ENCOUNTER — TRANSCRIPTION ENCOUNTER (OUTPATIENT)
Age: 29
End: 2024-10-03

## 2024-10-04 ENCOUNTER — APPOINTMENT (OUTPATIENT)
Dept: CT IMAGING | Facility: CLINIC | Age: 29
End: 2024-10-04
Payer: COMMERCIAL

## 2024-10-04 ENCOUNTER — OUTPATIENT (OUTPATIENT)
Dept: OUTPATIENT SERVICES | Facility: HOSPITAL | Age: 29
LOS: 1 days | End: 2024-10-04
Payer: COMMERCIAL

## 2024-10-04 DIAGNOSIS — Z00.8 ENCOUNTER FOR OTHER GENERAL EXAMINATION: ICD-10-CM

## 2024-10-04 PROCEDURE — 70496 CT ANGIOGRAPHY HEAD: CPT

## 2024-10-04 PROCEDURE — 70496 CT ANGIOGRAPHY HEAD: CPT | Mod: 26

## 2024-10-04 PROCEDURE — 70498 CT ANGIOGRAPHY NECK: CPT | Mod: 26

## 2024-10-04 PROCEDURE — 70498 CT ANGIOGRAPHY NECK: CPT

## 2024-10-07 ENCOUNTER — TRANSCRIPTION ENCOUNTER (OUTPATIENT)
Age: 29
End: 2024-10-07

## 2024-10-08 ENCOUNTER — APPOINTMENT (OUTPATIENT)
Dept: UROGYNECOLOGY | Facility: CLINIC | Age: 29
End: 2024-10-08

## 2024-10-08 VITALS
SYSTOLIC BLOOD PRESSURE: 117 MMHG | HEART RATE: 70 BPM | BODY MASS INDEX: 25.83 KG/M2 | DIASTOLIC BLOOD PRESSURE: 90 MMHG | WEIGHT: 155 LBS | HEIGHT: 65 IN

## 2024-10-08 DIAGNOSIS — N94.10 UNSPECIFIED DYSPAREUNIA: ICD-10-CM

## 2024-10-08 DIAGNOSIS — Z83.511 FAMILY HISTORY OF GLAUCOMA: ICD-10-CM

## 2024-10-08 DIAGNOSIS — R10.2 PELVIC AND PERINEAL PAIN: ICD-10-CM

## 2024-10-08 DIAGNOSIS — R35.0 FREQUENCY OF MICTURITION: ICD-10-CM

## 2024-10-08 DIAGNOSIS — N39.0 URINARY TRACT INFECTION, SITE NOT SPECIFIED: ICD-10-CM

## 2024-10-08 DIAGNOSIS — R39.15 URGENCY OF URINATION: ICD-10-CM

## 2024-10-08 DIAGNOSIS — K59.00 CONSTIPATION, UNSPECIFIED: ICD-10-CM

## 2024-10-08 DIAGNOSIS — R32 UNSPECIFIED URINARY INCONTINENCE: ICD-10-CM

## 2024-10-08 DIAGNOSIS — M53.9 DORSOPATHY, UNSPECIFIED: ICD-10-CM

## 2024-10-08 DIAGNOSIS — R39.13 SPLITTING OF URINARY STREAM: ICD-10-CM

## 2024-10-08 DIAGNOSIS — Z86.59 PERSONAL HISTORY OF OTHER MENTAL AND BEHAVIORAL DISORDERS: ICD-10-CM

## 2024-10-08 DIAGNOSIS — R30.0 DYSURIA: ICD-10-CM

## 2024-10-08 LAB
BILIRUB UR QL STRIP: NEGATIVE
CLARITY UR: CLEAR
COLLECTION METHOD: NORMAL
GLUCOSE UR-MCNC: NEGATIVE
HCG UR QL: 0.2 EU/DL
HGB UR QL STRIP.AUTO: NEGATIVE
KETONES UR-MCNC: NEGATIVE
LEUKOCYTE ESTERASE UR QL STRIP: NEGATIVE
NITRITE UR QL STRIP: NEGATIVE
PH UR STRIP: 6
PROT UR STRIP-MCNC: NEGATIVE
SP GR UR STRIP: 1.03

## 2024-10-08 PROCEDURE — 99214 OFFICE O/P EST MOD 30 MIN: CPT | Mod: 25

## 2024-10-08 PROCEDURE — 99459 PELVIC EXAMINATION: CPT

## 2024-10-08 PROCEDURE — 81003 URINALYSIS AUTO W/O SCOPE: CPT | Mod: QW

## 2024-10-08 PROCEDURE — 51701 INSERT BLADDER CATHETER: CPT | Mod: 59

## 2024-10-08 PROCEDURE — 99204 OFFICE O/P NEW MOD 45 MIN: CPT | Mod: 25

## 2024-10-09 ENCOUNTER — TRANSCRIPTION ENCOUNTER (OUTPATIENT)
Age: 29
End: 2024-10-09

## 2024-10-10 ENCOUNTER — TRANSCRIPTION ENCOUNTER (OUTPATIENT)
Age: 29
End: 2024-10-10

## 2024-10-22 ENCOUNTER — APPOINTMENT (OUTPATIENT)
Dept: VASCULAR SURGERY | Facility: CLINIC | Age: 29
End: 2024-10-22

## 2024-10-31 ENCOUNTER — APPOINTMENT (OUTPATIENT)
Dept: OPHTHALMOLOGY | Facility: CLINIC | Age: 29
End: 2024-10-31

## 2024-11-01 ENCOUNTER — APPOINTMENT (OUTPATIENT)
Dept: NEUROLOGY | Facility: CLINIC | Age: 29
End: 2024-11-01

## 2024-11-07 ENCOUNTER — TRANSCRIPTION ENCOUNTER (OUTPATIENT)
Age: 29
End: 2024-11-07

## 2024-11-26 ENCOUNTER — APPOINTMENT (OUTPATIENT)
Dept: OBGYN | Facility: CLINIC | Age: 29
End: 2024-11-26

## 2024-11-26 ENCOUNTER — APPOINTMENT (OUTPATIENT)
Dept: VASCULAR SURGERY | Facility: CLINIC | Age: 29
End: 2024-11-26

## 2024-12-13 ENCOUNTER — APPOINTMENT (OUTPATIENT)
Dept: CARDIOLOGY | Facility: CLINIC | Age: 29
End: 2024-12-13

## 2024-12-31 ENCOUNTER — APPOINTMENT (OUTPATIENT)
Dept: VASCULAR SURGERY | Facility: CLINIC | Age: 29
End: 2024-12-31

## 2024-12-31 VITALS
DIASTOLIC BLOOD PRESSURE: 72 MMHG | WEIGHT: 165 LBS | SYSTOLIC BLOOD PRESSURE: 106 MMHG | HEIGHT: 65 IN | TEMPERATURE: 98.6 F | BODY MASS INDEX: 27.49 KG/M2 | HEART RATE: 66 BPM

## 2024-12-31 PROCEDURE — XXXXX: CPT | Mod: 1L

## 2025-01-03 ENCOUNTER — RX RENEWAL (OUTPATIENT)
Age: 30
End: 2025-01-03

## 2025-01-09 ENCOUNTER — APPOINTMENT (OUTPATIENT)
Dept: GASTROENTEROLOGY | Facility: CLINIC | Age: 30
End: 2025-01-09
Payer: COMMERCIAL

## 2025-01-09 VITALS
SYSTOLIC BLOOD PRESSURE: 85 MMHG | RESPIRATION RATE: 14 BRPM | OXYGEN SATURATION: 98 % | BODY MASS INDEX: 26.66 KG/M2 | TEMPERATURE: 97 F | WEIGHT: 160 LBS | HEART RATE: 61 BPM | DIASTOLIC BLOOD PRESSURE: 73 MMHG | HEIGHT: 65 IN

## 2025-01-09 DIAGNOSIS — Z86.59 PERSONAL HISTORY OF OTHER MENTAL AND BEHAVIORAL DISORDERS: ICD-10-CM

## 2025-01-09 DIAGNOSIS — K59.00 CONSTIPATION, UNSPECIFIED: ICD-10-CM

## 2025-01-09 DIAGNOSIS — E78.5 HYPERLIPIDEMIA, UNSPECIFIED: ICD-10-CM

## 2025-01-09 DIAGNOSIS — Z87.19 PERSONAL HISTORY OF OTHER DISEASES OF THE DIGESTIVE SYSTEM: ICD-10-CM

## 2025-01-09 DIAGNOSIS — G43.109 MIGRAINE WITH AURA, NOT INTRACTABLE, W/OUT STATUS MIGRAINOSUS: ICD-10-CM

## 2025-01-09 DIAGNOSIS — Z78.9 OTHER SPECIFIED HEALTH STATUS: ICD-10-CM

## 2025-01-09 DIAGNOSIS — Z71.9 COUNSELING, UNSPECIFIED: ICD-10-CM

## 2025-01-09 DIAGNOSIS — R79.89 OTHER SPECIFIED ABNORMAL FINDINGS OF BLOOD CHEMISTRY: ICD-10-CM

## 2025-01-09 DIAGNOSIS — K21.9 GASTRO-ESOPHAGEAL REFLUX DISEASE W/OUT ESOPHAGITIS: ICD-10-CM

## 2025-01-09 DIAGNOSIS — Z83.49 FAMILY HISTORY OF OTHER ENDOCRINE, NUTRITIONAL AND METABOLIC DISEASES: ICD-10-CM

## 2025-01-09 DIAGNOSIS — R30.0 DYSURIA: ICD-10-CM

## 2025-01-09 DIAGNOSIS — Z82.0 FAMILY HISTORY OF EPILEPSY AND OTHER DISEASES OF THE NERVOUS SYSTEM: ICD-10-CM

## 2025-01-09 DIAGNOSIS — M53.9 DORSOPATHY, UNSPECIFIED: ICD-10-CM

## 2025-01-09 DIAGNOSIS — R13.10 DYSPHAGIA, UNSPECIFIED: ICD-10-CM

## 2025-01-09 DIAGNOSIS — Z83.511 FAMILY HISTORY OF GLAUCOMA: ICD-10-CM

## 2025-01-09 PROBLEM — R19.7 DIARRHEA: Status: ACTIVE | Noted: 2025-01-09

## 2025-01-09 PROCEDURE — 99204 OFFICE O/P NEW MOD 45 MIN: CPT

## 2025-01-09 RX ORDER — PSYLLIUM HUSK (WITH SUGAR) 3 G/7 G
43 POWDER (GRAM) ORAL DAILY
Qty: 1 | Refills: 0 | Status: ACTIVE | COMMUNITY
Start: 2025-01-09 | End: 1900-01-01

## 2025-01-09 RX ORDER — POLYETHYLENE GLYCOL 3350 17 G/17G
17 POWDER, FOR SOLUTION ORAL DAILY
Qty: 119 | Refills: 0 | Status: ACTIVE | COMMUNITY
Start: 2025-01-09 | End: 1900-01-01

## 2025-01-10 ENCOUNTER — APPOINTMENT (OUTPATIENT)
Dept: ORTHOPEDIC SURGERY | Facility: CLINIC | Age: 30
End: 2025-01-10
Payer: COMMERCIAL

## 2025-01-10 VITALS — HEIGHT: 65 IN | WEIGHT: 150 LBS | BODY MASS INDEX: 24.99 KG/M2

## 2025-01-10 PROBLEM — M53.3 COCCYDYNIA: Status: ACTIVE | Noted: 2025-01-10

## 2025-01-10 PROCEDURE — 72200 X-RAY EXAM SI JOINTS: CPT

## 2025-01-10 PROCEDURE — 99203 OFFICE O/P NEW LOW 30 MIN: CPT

## 2025-01-14 ENCOUNTER — APPOINTMENT (OUTPATIENT)
Dept: NEUROLOGY | Facility: CLINIC | Age: 30
End: 2025-01-14
Payer: COMMERCIAL

## 2025-01-14 ENCOUNTER — APPOINTMENT (OUTPATIENT)
Dept: INTERNAL MEDICINE | Facility: CLINIC | Age: 30
End: 2025-01-14
Payer: COMMERCIAL

## 2025-01-14 VITALS
OXYGEN SATURATION: 95 % | SYSTOLIC BLOOD PRESSURE: 102 MMHG | HEIGHT: 65 IN | WEIGHT: 150 LBS | DIASTOLIC BLOOD PRESSURE: 66 MMHG | HEART RATE: 64 BPM | BODY MASS INDEX: 24.99 KG/M2

## 2025-01-14 DIAGNOSIS — R55 SYNCOPE AND COLLAPSE: ICD-10-CM

## 2025-01-14 DIAGNOSIS — Q79.62 HYPERMOBILE EHLERS-DANLOS SYNDROME: ICD-10-CM

## 2025-01-14 DIAGNOSIS — F32.A ANXIETY DISORDER, UNSPECIFIED: ICD-10-CM

## 2025-01-14 DIAGNOSIS — M25.569 PAIN IN UNSPECIFIED KNEE: ICD-10-CM

## 2025-01-14 DIAGNOSIS — R06.02 SHORTNESS OF BREATH: ICD-10-CM

## 2025-01-14 DIAGNOSIS — F41.9 ANXIETY DISORDER, UNSPECIFIED: ICD-10-CM

## 2025-01-14 DIAGNOSIS — H51.11 CONVERGENCE INSUFFICIENCY: ICD-10-CM

## 2025-01-14 DIAGNOSIS — M53.3 SACROCOCCYGEAL DISORDERS, NOT ELSEWHERE CLASSIFIED: ICD-10-CM

## 2025-01-14 DIAGNOSIS — Z87.898 PERSONAL HISTORY OF OTHER SPECIFIED CONDITIONS: ICD-10-CM

## 2025-01-14 DIAGNOSIS — M54.2 CERVICALGIA: ICD-10-CM

## 2025-01-14 DIAGNOSIS — G43.909 MIGRAINE, UNSPECIFIED, NOT INTRACTABLE, W/OUT STATUS MIGRAINOSUS: ICD-10-CM

## 2025-01-14 DIAGNOSIS — M24.20 DISORDER OF LIGAMENT, UNSPECIFIED SITE: ICD-10-CM

## 2025-01-14 DIAGNOSIS — Z86.018 PERSONAL HISTORY OF OTHER BENIGN NEOPLASM: ICD-10-CM

## 2025-01-14 DIAGNOSIS — R42 DIZZINESS AND GIDDINESS: ICD-10-CM

## 2025-01-14 PROCEDURE — 99214 OFFICE O/P EST MOD 30 MIN: CPT

## 2025-01-14 PROCEDURE — G2211 COMPLEX E/M VISIT ADD ON: CPT | Mod: NC

## 2025-01-14 RX ORDER — ELETRIPTAN HYDROBROMIDE 40 MG/1
40 TABLET, FILM COATED ORAL
Qty: 9 | Refills: 4 | Status: ACTIVE | COMMUNITY
Start: 2025-01-14 | End: 1900-01-01

## 2025-01-14 RX ORDER — MIDODRINE HYDROCHLORIDE 10 MG/1
TABLET ORAL
Refills: 0 | Status: ACTIVE | COMMUNITY

## 2025-01-15 ENCOUNTER — APPOINTMENT (OUTPATIENT)
Dept: UROGYNECOLOGY | Facility: CLINIC | Age: 30
End: 2025-01-15
Payer: COMMERCIAL

## 2025-01-15 ENCOUNTER — TRANSCRIPTION ENCOUNTER (OUTPATIENT)
Age: 30
End: 2025-01-15

## 2025-01-15 ENCOUNTER — APPOINTMENT (OUTPATIENT)
Dept: OBGYN | Facility: CLINIC | Age: 30
End: 2025-01-15
Payer: COMMERCIAL

## 2025-01-15 ENCOUNTER — LABORATORY RESULT (OUTPATIENT)
Age: 30
End: 2025-01-15

## 2025-01-15 VITALS
DIASTOLIC BLOOD PRESSURE: 67 MMHG | TEMPERATURE: 97.6 F | SYSTOLIC BLOOD PRESSURE: 127 MMHG | WEIGHT: 165 LBS | HEART RATE: 58 BPM | BODY MASS INDEX: 27.49 KG/M2 | HEIGHT: 65 IN

## 2025-01-15 VITALS
BODY MASS INDEX: 28.16 KG/M2 | SYSTOLIC BLOOD PRESSURE: 100 MMHG | WEIGHT: 169 LBS | DIASTOLIC BLOOD PRESSURE: 62 MMHG | HEIGHT: 65 IN

## 2025-01-15 DIAGNOSIS — G89.29 PELVIC AND PERINEAL PAIN: ICD-10-CM

## 2025-01-15 DIAGNOSIS — R10.2 PELVIC AND PERINEAL PAIN: ICD-10-CM

## 2025-01-15 DIAGNOSIS — M62.89 OTHER SPECIFIED DISORDERS OF MUSCLE: ICD-10-CM

## 2025-01-15 DIAGNOSIS — T50.8X5A ADVERSE EFFECT OF DIAGNOSTIC AGENTS, INITIAL ENCOUNTER: ICD-10-CM

## 2025-01-15 DIAGNOSIS — N89.8 OTHER SPECIFIED NONINFLAMMATORY DISORDERS OF VAGINA: ICD-10-CM

## 2025-01-15 DIAGNOSIS — M79.18 MYALGIA, OTHER SITE: ICD-10-CM

## 2025-01-15 DIAGNOSIS — I87.1 COMPRESSION OF VEIN: ICD-10-CM

## 2025-01-15 DIAGNOSIS — R39.9 UNSPECIFIED SYMPTOMS AND SIGNS INVOLVING THE GENITOURINARY SYSTEM: ICD-10-CM

## 2025-01-15 DIAGNOSIS — Z01.411 ENCOUNTER FOR GYNECOLOGICAL EXAMINATION (GENERAL) (ROUTINE) WITH ABNORMAL FINDINGS: ICD-10-CM

## 2025-01-15 DIAGNOSIS — N94.819 VULVODYNIA, UNSPECIFIED: ICD-10-CM

## 2025-01-15 PROCEDURE — 99395 PREV VISIT EST AGE 18-39: CPT

## 2025-01-15 PROCEDURE — 99459 PELVIC EXAMINATION: CPT

## 2025-01-15 PROCEDURE — 99214 OFFICE O/P EST MOD 30 MIN: CPT | Mod: 25

## 2025-01-15 PROCEDURE — 99215 OFFICE O/P EST HI 40 MIN: CPT

## 2025-01-15 RX ORDER — SPIRONOLACTONE 25 MG/1
25 TABLET ORAL DAILY
Refills: 0 | Status: ACTIVE | COMMUNITY
Start: 2025-01-15

## 2025-01-15 RX ORDER — METHYLPREDNISOLONE 8 MG/1
8 TABLET ORAL
Qty: 8 | Refills: 0 | Status: ACTIVE | COMMUNITY
Start: 2025-01-15 | End: 1900-01-01

## 2025-01-16 LAB
APPEARANCE: ABNORMAL
BACTERIA: ABNORMAL /HPF
BILIRUBIN URINE: NEGATIVE
BLOOD URINE: NEGATIVE
C TRACH RRNA SPEC QL NAA+PROBE: NOT DETECTED
CANDIDA VAG CYTO: NOT DETECTED
CAST: 0 /LPF
COLOR: YELLOW
EPITHELIAL CELLS: 3 /HPF
G VAGINALIS+PREV SP MTYP VAG QL MICRO: NOT DETECTED
GLUCOSE QUALITATIVE U: NEGATIVE MG/DL
KETONES URINE: NEGATIVE MG/DL
LEUKOCYTE ESTERASE URINE: ABNORMAL
MICROSCOPIC-UA: NORMAL
N GONORRHOEA RRNA SPEC QL NAA+PROBE: NOT DETECTED
NITRITE URINE: NEGATIVE
PH URINE: 5.5
PROTEIN URINE: NEGATIVE MG/DL
RED BLOOD CELLS URINE: 3 /HPF
REVIEW: NORMAL
SOURCE TP AMPLIFICATION: NORMAL
SPECIFIC GRAVITY URINE: 1.02
T VAGINALIS VAG QL WET PREP: NOT DETECTED
UROBILINOGEN URINE: 0.2 MG/DL
WHITE BLOOD CELLS URINE: 3 /HPF

## 2025-01-17 ENCOUNTER — TRANSCRIPTION ENCOUNTER (OUTPATIENT)
Age: 30
End: 2025-01-17

## 2025-01-17 LAB
BACTERIA UR CULT: NORMAL
CANDIDA VAG CYTO: NOT DETECTED
G VAGINALIS+PREV SP MTYP VAG QL MICRO: NOT DETECTED
T VAGINALIS VAG QL WET PREP: NOT DETECTED

## 2025-01-21 LAB — CYTOLOGY CVX/VAG DOC THIN PREP: NORMAL

## 2025-01-22 ENCOUNTER — APPOINTMENT (OUTPATIENT)
Dept: CT IMAGING | Facility: CLINIC | Age: 30
End: 2025-01-22
Payer: COMMERCIAL

## 2025-01-22 ENCOUNTER — APPOINTMENT (OUTPATIENT)
Dept: MRI IMAGING | Facility: CLINIC | Age: 30
End: 2025-01-22
Payer: COMMERCIAL

## 2025-01-22 ENCOUNTER — OUTPATIENT (OUTPATIENT)
Dept: OUTPATIENT SERVICES | Facility: HOSPITAL | Age: 30
LOS: 1 days | End: 2025-01-22
Payer: COMMERCIAL

## 2025-01-22 ENCOUNTER — RESULT REVIEW (OUTPATIENT)
Age: 30
End: 2025-01-22

## 2025-01-22 DIAGNOSIS — M53.3 SACROCOCCYGEAL DISORDERS, NOT ELSEWHERE CLASSIFIED: ICD-10-CM

## 2025-01-22 DIAGNOSIS — I87.1 COMPRESSION OF VEIN: ICD-10-CM

## 2025-01-22 DIAGNOSIS — Z00.8 ENCOUNTER FOR OTHER GENERAL EXAMINATION: ICD-10-CM

## 2025-01-22 DIAGNOSIS — Q79.62 HYPERMOBILE EHLERS-DANLOS SYNDROME: ICD-10-CM

## 2025-01-22 PROCEDURE — 72197 MRI PELVIS W/O & W/DYE: CPT | Mod: 26

## 2025-01-22 PROCEDURE — A9585: CPT

## 2025-01-22 PROCEDURE — 74177 CT ABD & PELVIS W/CONTRAST: CPT | Mod: 26

## 2025-01-22 PROCEDURE — 74177 CT ABD & PELVIS W/CONTRAST: CPT

## 2025-01-22 PROCEDURE — 72197 MRI PELVIS W/O & W/DYE: CPT

## 2025-01-23 ENCOUNTER — APPOINTMENT (OUTPATIENT)
Dept: PHYSICAL MEDICINE AND REHAB | Facility: CLINIC | Age: 30
End: 2025-01-23

## 2025-01-24 ENCOUNTER — APPOINTMENT (OUTPATIENT)
Dept: ORTHOPEDIC SURGERY | Facility: CLINIC | Age: 30
End: 2025-01-24

## 2025-01-27 ENCOUNTER — APPOINTMENT (OUTPATIENT)
Dept: ORTHOPEDIC SURGERY | Facility: CLINIC | Age: 30
End: 2025-01-27
Payer: COMMERCIAL

## 2025-01-27 VITALS — HEIGHT: 65 IN | WEIGHT: 169 LBS | BODY MASS INDEX: 28.16 KG/M2

## 2025-01-27 DIAGNOSIS — R10.2 PELVIC AND PERINEAL PAIN: ICD-10-CM

## 2025-01-27 DIAGNOSIS — G89.29 PELVIC AND PERINEAL PAIN: ICD-10-CM

## 2025-01-27 PROCEDURE — 99213 OFFICE O/P EST LOW 20 MIN: CPT

## 2025-01-30 ENCOUNTER — APPOINTMENT (OUTPATIENT)
Dept: INTERNAL MEDICINE | Facility: CLINIC | Age: 30
End: 2025-01-30

## 2025-01-30 VITALS
HEART RATE: 67 BPM | HEIGHT: 65 IN | RESPIRATION RATE: 16 BRPM | DIASTOLIC BLOOD PRESSURE: 78 MMHG | TEMPERATURE: 97.9 F | BODY MASS INDEX: 28.32 KG/M2 | OXYGEN SATURATION: 98 % | WEIGHT: 170 LBS | SYSTOLIC BLOOD PRESSURE: 127 MMHG

## 2025-01-30 DIAGNOSIS — Z87.19 PERSONAL HISTORY OF OTHER DISEASES OF THE DIGESTIVE SYSTEM: ICD-10-CM

## 2025-01-30 DIAGNOSIS — Z83.511 FAMILY HISTORY OF GLAUCOMA: ICD-10-CM

## 2025-01-30 DIAGNOSIS — Z00.00 ENCOUNTER FOR GENERAL ADULT MEDICAL EXAMINATION W/OUT ABNORMAL FINDINGS: ICD-10-CM

## 2025-01-30 LAB
ALBUMIN SERPL ELPH-MCNC: 4.5 G/DL
ALP BLD-CCNC: 70 U/L
ALT SERPL-CCNC: 49 U/L
ANION GAP SERPL CALC-SCNC: 11 MMOL/L
AST SERPL-CCNC: 26 U/L
BILIRUB SERPL-MCNC: 0.3 MG/DL
BUN SERPL-MCNC: 11 MG/DL
CALCIUM SERPL-MCNC: 9.6 MG/DL
CHLORIDE SERPL-SCNC: 103 MMOL/L
CHOLEST SERPL-MCNC: 226 MG/DL
CO2 SERPL-SCNC: 27 MMOL/L
CREAT SERPL-MCNC: 0.87 MG/DL
EGFR: 92 ML/MIN/1.73M2
GLUCOSE SERPL-MCNC: 84 MG/DL
HCT VFR BLD CALC: 42.8 %
HDLC SERPL-MCNC: 58 MG/DL
HGB BLD-MCNC: 13.6 G/DL
LDLC SERPL CALC-MCNC: 138 MG/DL
MCHC RBC-ENTMCNC: 30.2 PG
MCHC RBC-ENTMCNC: 31.8 G/DL
MCV RBC AUTO: 94.9 FL
NONHDLC SERPL-MCNC: 168 MG/DL
PLATELET # BLD AUTO: 254 K/UL
POTASSIUM SERPL-SCNC: 4.6 MMOL/L
PROT SERPL-MCNC: 6.9 G/DL
RBC # BLD: 4.51 M/UL
RBC # FLD: 13.3 %
SODIUM SERPL-SCNC: 141 MMOL/L
TRIGL SERPL-MCNC: 168 MG/DL
TSH SERPL-ACNC: 2.83 UIU/ML
WBC # FLD AUTO: 7.61 K/UL

## 2025-01-30 PROCEDURE — 99395 PREV VISIT EST AGE 18-39: CPT

## 2025-01-31 ENCOUNTER — APPOINTMENT (OUTPATIENT)
Dept: PHYSICAL MEDICINE AND REHAB | Facility: CLINIC | Age: 30
End: 2025-01-31
Payer: COMMERCIAL

## 2025-01-31 ENCOUNTER — NON-APPOINTMENT (OUTPATIENT)
Age: 30
End: 2025-01-31

## 2025-01-31 DIAGNOSIS — M25.562 PAIN IN LEFT KNEE: ICD-10-CM

## 2025-01-31 PROCEDURE — 99204 OFFICE O/P NEW MOD 45 MIN: CPT

## 2025-01-31 PROCEDURE — 99214 OFFICE O/P EST MOD 30 MIN: CPT

## 2025-02-01 PROBLEM — M99.04: Status: ACTIVE | Noted: 2025-02-01

## 2025-02-03 ENCOUNTER — APPOINTMENT (OUTPATIENT)
Dept: PHYSICAL MEDICINE AND REHAB | Facility: CLINIC | Age: 30
End: 2025-02-03
Payer: COMMERCIAL

## 2025-02-03 DIAGNOSIS — M99.06 SEGMENTAL AND SOMATIC DYSFUNCTION OF LOWER EXTREMITY: ICD-10-CM

## 2025-02-03 DIAGNOSIS — M35.7 HYPERMOBILITY SYNDROME: ICD-10-CM

## 2025-02-03 PROCEDURE — 99214 OFFICE O/P EST MOD 30 MIN: CPT | Mod: 25

## 2025-02-03 PROCEDURE — 98926 OSTEOPATH MANJ 3-4 REGIONS: CPT

## 2025-02-04 ENCOUNTER — TRANSCRIPTION ENCOUNTER (OUTPATIENT)
Age: 30
End: 2025-02-04

## 2025-02-07 ENCOUNTER — NON-APPOINTMENT (OUTPATIENT)
Age: 30
End: 2025-02-07

## 2025-02-10 ENCOUNTER — APPOINTMENT (OUTPATIENT)
Dept: PAIN MANAGEMENT | Facility: CLINIC | Age: 30
End: 2025-02-10

## 2025-02-10 ENCOUNTER — APPOINTMENT (OUTPATIENT)
Dept: PHYSICAL MEDICINE AND REHAB | Facility: CLINIC | Age: 30
End: 2025-02-10
Payer: COMMERCIAL

## 2025-02-10 DIAGNOSIS — M54.2 CERVICALGIA: ICD-10-CM

## 2025-02-10 DIAGNOSIS — G89.29 PELVIC AND PERINEAL PAIN: ICD-10-CM

## 2025-02-10 DIAGNOSIS — R10.2 PELVIC AND PERINEAL PAIN: ICD-10-CM

## 2025-02-10 DIAGNOSIS — M99.04 SEGMENTAL AND SOMATIC DYSFUNCTION OF SACRAL REGION: ICD-10-CM

## 2025-02-10 PROCEDURE — 99214 OFFICE O/P EST MOD 30 MIN: CPT | Mod: 25

## 2025-02-10 PROCEDURE — 98926 OSTEOPATH MANJ 3-4 REGIONS: CPT

## 2025-02-11 ENCOUNTER — APPOINTMENT (OUTPATIENT)
Dept: INTERNAL MEDICINE | Facility: CLINIC | Age: 30
End: 2025-02-11
Payer: COMMERCIAL

## 2025-02-11 DIAGNOSIS — R74.8 ABNORMAL LEVELS OF OTHER SERUM ENZYMES: ICD-10-CM

## 2025-02-11 DIAGNOSIS — Q79.62 HYPERMOBILE EHLERS-DANLOS SYNDROME: ICD-10-CM

## 2025-02-11 DIAGNOSIS — Z87.2 PERSONAL HISTORY OF DISEASES OF THE SKIN AND SUBCUTANEOUS TISSUE: ICD-10-CM

## 2025-02-11 DIAGNOSIS — Z87.898 PERSONAL HISTORY OF OTHER SPECIFIED CONDITIONS: ICD-10-CM

## 2025-02-11 DIAGNOSIS — Z88.9 ALLERGY STATUS TO UNSPECIFIED DRUGS, MEDICAMENTS AND BIOLOGICAL SUBSTANCES: ICD-10-CM

## 2025-02-11 DIAGNOSIS — E78.5 HYPERLIPIDEMIA, UNSPECIFIED: ICD-10-CM

## 2025-02-11 PROCEDURE — 99214 OFFICE O/P EST MOD 30 MIN: CPT | Mod: 95

## 2025-02-11 PROCEDURE — G2211 COMPLEX E/M VISIT ADD ON: CPT | Mod: NC,95

## 2025-02-21 ENCOUNTER — APPOINTMENT (OUTPATIENT)
Dept: PHYSICAL MEDICINE AND REHAB | Facility: CLINIC | Age: 30
End: 2025-02-21
Payer: COMMERCIAL

## 2025-02-21 PROCEDURE — 98926 OSTEOPATH MANJ 3-4 REGIONS: CPT

## 2025-02-21 PROCEDURE — 99214 OFFICE O/P EST MOD 30 MIN: CPT | Mod: 25

## 2025-02-25 ENCOUNTER — APPOINTMENT (OUTPATIENT)
Dept: PHYSICAL MEDICINE AND REHAB | Facility: CLINIC | Age: 30
End: 2025-02-25

## 2025-02-27 ENCOUNTER — NON-APPOINTMENT (OUTPATIENT)
Age: 30
End: 2025-02-27

## 2025-02-27 DIAGNOSIS — I77.89 OTHER SPECIFIED DISORDERS OF ARTERIES AND ARTERIOLES: ICD-10-CM

## 2025-02-27 DIAGNOSIS — Q23.81 BICUSPID AORTIC VALVE: ICD-10-CM

## 2025-02-27 DIAGNOSIS — Z13.79 ENCOUNTER FOR OTHER SCREENING FOR GENETIC AND CHROMOSOMAL ANOMALIES: ICD-10-CM

## 2025-03-03 ENCOUNTER — APPOINTMENT (OUTPATIENT)
Dept: PHYSICAL MEDICINE AND REHAB | Facility: CLINIC | Age: 30
End: 2025-03-03
Payer: COMMERCIAL

## 2025-03-03 ENCOUNTER — TRANSCRIPTION ENCOUNTER (OUTPATIENT)
Age: 30
End: 2025-03-03

## 2025-03-03 DIAGNOSIS — M62.89 OTHER SPECIFIED DISORDERS OF MUSCLE: ICD-10-CM

## 2025-03-03 DIAGNOSIS — M99.08 SEGMENTAL AND SOMATIC DYSFUNCTION OF RIB CAGE: ICD-10-CM

## 2025-03-03 DIAGNOSIS — M99.04 SEGMENTAL AND SOMATIC DYSFUNCTION OF SACRAL REGION: ICD-10-CM

## 2025-03-03 PROCEDURE — 99214 OFFICE O/P EST MOD 30 MIN: CPT | Mod: 25

## 2025-03-03 PROCEDURE — 98926 OSTEOPATH MANJ 3-4 REGIONS: CPT

## 2025-03-07 ENCOUNTER — OUTPATIENT (OUTPATIENT)
Dept: OUTPATIENT SERVICES | Facility: HOSPITAL | Age: 30
LOS: 1 days | End: 2025-03-07

## 2025-03-07 ENCOUNTER — APPOINTMENT (OUTPATIENT)
Dept: GASTROENTEROLOGY | Facility: GI CENTER | Age: 30
End: 2025-03-07

## 2025-03-07 DIAGNOSIS — R13.10 DYSPHAGIA, UNSPECIFIED: ICD-10-CM

## 2025-03-07 DIAGNOSIS — K21.9 GASTRO-ESOPHAGEAL REFLUX DISEASE WITHOUT ESOPHAGITIS: ICD-10-CM

## 2025-03-07 PROCEDURE — 91013 ESOPHGL MOTIL W/STIM/PERFUS: CPT | Mod: 26

## 2025-03-07 PROCEDURE — 91010 ESOPHAGUS MOTILITY STUDY: CPT | Mod: 26

## 2025-03-07 PROCEDURE — 91037 ESOPH IMPED FUNCTION TEST: CPT | Mod: 26

## 2025-03-07 PROCEDURE — 91037 ESOPH IMPED FUNCTION TEST: CPT

## 2025-03-10 ENCOUNTER — NON-APPOINTMENT (OUTPATIENT)
Age: 30
End: 2025-03-10

## 2025-03-10 ENCOUNTER — APPOINTMENT (OUTPATIENT)
Dept: PHYSICAL MEDICINE AND REHAB | Facility: CLINIC | Age: 30
End: 2025-03-10

## 2025-03-26 ENCOUNTER — TRANSCRIPTION ENCOUNTER (OUTPATIENT)
Age: 30
End: 2025-03-26

## 2025-03-27 ENCOUNTER — TRANSCRIPTION ENCOUNTER (OUTPATIENT)
Age: 30
End: 2025-03-27

## 2025-04-01 ENCOUNTER — APPOINTMENT (OUTPATIENT)
Dept: GASTROENTEROLOGY | Facility: CLINIC | Age: 30
End: 2025-04-01
Payer: COMMERCIAL

## 2025-04-01 ENCOUNTER — APPOINTMENT (OUTPATIENT)
Dept: OBGYN | Facility: CLINIC | Age: 30
End: 2025-04-01

## 2025-04-01 ENCOUNTER — APPOINTMENT (OUTPATIENT)
Dept: RHEUMATOLOGY | Facility: CLINIC | Age: 30
End: 2025-04-01
Payer: COMMERCIAL

## 2025-04-01 VITALS
BODY MASS INDEX: 28.32 KG/M2 | WEIGHT: 170 LBS | DIASTOLIC BLOOD PRESSURE: 72 MMHG | SYSTOLIC BLOOD PRESSURE: 110 MMHG | OXYGEN SATURATION: 98 % | HEIGHT: 65 IN | HEART RATE: 58 BPM

## 2025-04-01 VITALS
BODY MASS INDEX: 28.32 KG/M2 | HEIGHT: 65 IN | SYSTOLIC BLOOD PRESSURE: 107 MMHG | DIASTOLIC BLOOD PRESSURE: 73 MMHG | WEIGHT: 170 LBS

## 2025-04-01 DIAGNOSIS — G54.0 BRACHIAL PLEXUS DISORDERS: ICD-10-CM

## 2025-04-01 DIAGNOSIS — M62.89 OTHER SPECIFIED DISORDERS OF MUSCLE: ICD-10-CM

## 2025-04-01 DIAGNOSIS — I87.1 COMPRESSION OF VEIN: ICD-10-CM

## 2025-04-01 DIAGNOSIS — G90.A POSTURAL ORTHOSTATIC TACHYCARDIA SYNDROME [POTS]: ICD-10-CM

## 2025-04-01 DIAGNOSIS — Q79.62 HYPERMOBILE EHLERS-DANLOS SYNDROME: ICD-10-CM

## 2025-04-01 DIAGNOSIS — M25.50 PAIN IN UNSPECIFIED JOINT: ICD-10-CM

## 2025-04-01 DIAGNOSIS — K58.9 IRRITABLE BOWEL SYNDROME, UNSPECIFIED: ICD-10-CM

## 2025-04-01 DIAGNOSIS — M24.20 DISORDER OF LIGAMENT, UNSPECIFIED SITE: ICD-10-CM

## 2025-04-01 PROCEDURE — 99213 OFFICE O/P EST LOW 20 MIN: CPT | Mod: 93,93

## 2025-04-01 PROCEDURE — 99215 OFFICE O/P EST HI 40 MIN: CPT

## 2025-04-01 PROCEDURE — 99214 OFFICE O/P EST MOD 30 MIN: CPT

## 2025-04-01 PROCEDURE — G2211 COMPLEX E/M VISIT ADD ON: CPT

## 2025-04-01 RX ORDER — RABEPRAZOLE SODIUM 20 MG/1
20 TABLET, DELAYED RELEASE ORAL TWICE DAILY
Qty: 180 | Refills: 0 | Status: ACTIVE | COMMUNITY
Start: 2025-04-01 | End: 1900-01-01

## 2025-04-02 ENCOUNTER — APPOINTMENT (OUTPATIENT)
Dept: NEUROLOGY | Facility: CLINIC | Age: 30
End: 2025-04-02

## 2025-04-02 PROBLEM — I87.1 NUTCRACKER PHENOMENON OF RENAL VEIN: Status: RESOLVED | Noted: 2025-01-15 | Resolved: 2025-04-02

## 2025-04-04 PROBLEM — Z30.8 ENCOUNTER FOR TUBAL LIGATION COUNSELING: Status: ACTIVE | Noted: 2025-04-04

## 2025-04-08 ENCOUNTER — APPOINTMENT (OUTPATIENT)
Dept: OBGYN | Facility: CLINIC | Age: 30
End: 2025-04-08
Payer: COMMERCIAL

## 2025-04-08 DIAGNOSIS — Z30.8 ENCOUNTER FOR OTHER CONTRACEPTIVE MANAGEMENT: ICD-10-CM

## 2025-04-08 PROCEDURE — 99213 OFFICE O/P EST LOW 20 MIN: CPT | Mod: 95

## 2025-04-16 ENCOUNTER — APPOINTMENT (OUTPATIENT)
Dept: UROGYNECOLOGY | Facility: CLINIC | Age: 30
End: 2025-04-16
Payer: COMMERCIAL

## 2025-04-16 VITALS — DIASTOLIC BLOOD PRESSURE: 61 MMHG | TEMPERATURE: 98 F | HEART RATE: 68 BPM | SYSTOLIC BLOOD PRESSURE: 93 MMHG

## 2025-04-16 DIAGNOSIS — M62.89 OTHER SPECIFIED DISORDERS OF MUSCLE: ICD-10-CM

## 2025-04-16 DIAGNOSIS — M79.18 MYALGIA, OTHER SITE: ICD-10-CM

## 2025-04-16 DIAGNOSIS — N94.10 UNSPECIFIED DYSPAREUNIA: ICD-10-CM

## 2025-04-16 DIAGNOSIS — N94.819 VULVODYNIA, UNSPECIFIED: ICD-10-CM

## 2025-04-16 DIAGNOSIS — G89.29 PELVIC AND PERINEAL PAIN: ICD-10-CM

## 2025-04-16 DIAGNOSIS — R10.2 PELVIC AND PERINEAL PAIN: ICD-10-CM

## 2025-04-16 PROCEDURE — 99214 OFFICE O/P EST MOD 30 MIN: CPT

## 2025-04-30 ENCOUNTER — APPOINTMENT (OUTPATIENT)
Dept: NEUROLOGY | Facility: CLINIC | Age: 30
End: 2025-04-30

## 2025-04-30 VITALS
DIASTOLIC BLOOD PRESSURE: 84 MMHG | SYSTOLIC BLOOD PRESSURE: 131 MMHG | WEIGHT: 175 LBS | HEART RATE: 52 BPM | HEIGHT: 65 IN | BODY MASS INDEX: 29.16 KG/M2 | OXYGEN SATURATION: 99 %

## 2025-04-30 DIAGNOSIS — G43.909 MIGRAINE, UNSPECIFIED, NOT INTRACTABLE, W/OUT STATUS MIGRAINOSUS: ICD-10-CM

## 2025-04-30 DIAGNOSIS — G62.9 POLYNEUROPATHY, UNSPECIFIED: ICD-10-CM

## 2025-04-30 PROCEDURE — 99214 OFFICE O/P EST MOD 30 MIN: CPT

## 2025-06-18 PROBLEM — Z76.89 ENCOUNTER TO ESTABLISH CARE WITH NEW DOCTOR: Status: ACTIVE | Noted: 2025-06-18

## 2025-06-18 PROBLEM — Q79.60 EHLERS-DANLOS SYNDROME: Status: RESOLVED | Noted: 2025-06-18 | Resolved: 2025-06-18

## 2025-06-18 PROBLEM — J45.990 EXERCISE-INDUCED ASTHMA: Status: RESOLVED | Noted: 2025-06-18 | Resolved: 2025-06-18

## 2025-06-18 PROBLEM — Z87.2 HISTORY OF ACNE: Status: RESOLVED | Noted: 2025-06-18 | Resolved: 2025-06-18

## 2025-06-18 PROBLEM — Z30.09 CONSULTATION FOR FEMALE STERILIZATION: Status: ACTIVE | Noted: 2025-06-18

## 2025-06-18 PROBLEM — L68.0 HIRSUTISM: Status: RESOLVED | Noted: 2025-06-18 | Resolved: 2025-06-18

## 2025-07-14 ENCOUNTER — RX RENEWAL (OUTPATIENT)
Age: 30
End: 2025-07-14

## 2025-07-30 ENCOUNTER — APPOINTMENT (OUTPATIENT)
Dept: UROGYNECOLOGY | Facility: CLINIC | Age: 30
End: 2025-07-30
Payer: COMMERCIAL

## 2025-07-30 VITALS — SYSTOLIC BLOOD PRESSURE: 110 MMHG | DIASTOLIC BLOOD PRESSURE: 76 MMHG | HEART RATE: 80 BPM | TEMPERATURE: 98.1 F

## 2025-07-30 DIAGNOSIS — M79.18 MYALGIA, OTHER SITE: ICD-10-CM

## 2025-07-30 DIAGNOSIS — G89.29 PELVIC AND PERINEAL PAIN: ICD-10-CM

## 2025-07-30 DIAGNOSIS — N94.10 UNSPECIFIED DYSPAREUNIA: ICD-10-CM

## 2025-07-30 DIAGNOSIS — R10.2 PELVIC AND PERINEAL PAIN: ICD-10-CM

## 2025-07-30 DIAGNOSIS — M62.89 OTHER SPECIFIED DISORDERS OF MUSCLE: ICD-10-CM

## 2025-07-30 DIAGNOSIS — N94.819 VULVODYNIA, UNSPECIFIED: ICD-10-CM

## 2025-07-30 PROCEDURE — 99214 OFFICE O/P EST MOD 30 MIN: CPT

## 2025-08-19 ENCOUNTER — OUTPATIENT (OUTPATIENT)
Dept: OUTPATIENT SERVICES | Facility: HOSPITAL | Age: 30
LOS: 1 days | End: 2025-08-19
Payer: COMMERCIAL

## 2025-08-19 DIAGNOSIS — R13.10 DYSPHAGIA, UNSPECIFIED: ICD-10-CM

## 2025-08-19 PROCEDURE — 74220 X-RAY XM ESOPHAGUS 1CNTRST: CPT | Mod: 26

## 2025-08-19 PROCEDURE — 74220 X-RAY XM ESOPHAGUS 1CNTRST: CPT

## 2025-08-21 ENCOUNTER — APPOINTMENT (OUTPATIENT)
Dept: RHEUMATOLOGY | Facility: CLINIC | Age: 30
End: 2025-08-21
Payer: COMMERCIAL

## 2025-08-21 DIAGNOSIS — M24.20 DISORDER OF LIGAMENT, UNSPECIFIED SITE: ICD-10-CM

## 2025-08-21 DIAGNOSIS — M25.50 PAIN IN UNSPECIFIED JOINT: ICD-10-CM

## 2025-08-21 DIAGNOSIS — G89.29 HEADACHE, UNSPECIFIED: ICD-10-CM

## 2025-08-21 DIAGNOSIS — G90.A POSTURAL ORTHOSTATIC TACHYCARDIA SYNDROME [POTS]: ICD-10-CM

## 2025-08-21 DIAGNOSIS — G54.0 BRACHIAL PLEXUS DISORDERS: ICD-10-CM

## 2025-08-21 DIAGNOSIS — Q79.62 HYPERMOBILE EHLERS-DANLOS SYNDROME: ICD-10-CM

## 2025-08-21 DIAGNOSIS — R51.9 HEADACHE, UNSPECIFIED: ICD-10-CM

## 2025-08-21 PROCEDURE — G2211 COMPLEX E/M VISIT ADD ON: CPT | Mod: NC

## 2025-08-21 PROCEDURE — 36415 COLL VENOUS BLD VENIPUNCTURE: CPT

## 2025-08-21 PROCEDURE — 99215 OFFICE O/P EST HI 40 MIN: CPT

## 2025-08-21 RX ORDER — PROGESTERONE 200 MG/1
CAPSULE ORAL
Refills: 0 | Status: ACTIVE | COMMUNITY

## 2025-08-22 LAB
ANION GAP SERPL CALC-SCNC: 14 MMOL/L
BUN SERPL-MCNC: 11 MG/DL
CALCIUM SERPL-MCNC: 9.9 MG/DL
CHLORIDE SERPL-SCNC: 102 MMOL/L
CO2 SERPL-SCNC: 24 MMOL/L
CREAT SERPL-MCNC: 0.99 MG/DL
EGFRCR SERPLBLD CKD-EPI 2021: 79 ML/MIN/1.73M2
GLUCOSE SERPL-MCNC: 96 MG/DL
POTASSIUM SERPL-SCNC: 4.5 MMOL/L
SODIUM SERPL-SCNC: 140 MMOL/L

## 2025-08-25 ENCOUNTER — APPOINTMENT (OUTPATIENT)
Dept: MRI IMAGING | Facility: CLINIC | Age: 30
End: 2025-08-25
Payer: COMMERCIAL

## 2025-08-25 PROCEDURE — A9585: CPT

## 2025-08-25 PROCEDURE — 70553 MRI BRAIN STEM W/O & W/DYE: CPT

## 2025-08-29 ENCOUNTER — APPOINTMENT (OUTPATIENT)
Dept: CT IMAGING | Facility: CLINIC | Age: 30
End: 2025-08-29

## 2025-08-29 ENCOUNTER — TRANSCRIPTION ENCOUNTER (OUTPATIENT)
Age: 30
End: 2025-08-29

## 2025-08-29 ENCOUNTER — OUTPATIENT (OUTPATIENT)
Dept: OUTPATIENT SERVICES | Facility: HOSPITAL | Age: 30
LOS: 1 days | End: 2025-08-29
Payer: COMMERCIAL

## 2025-08-29 DIAGNOSIS — M85.88 OTHER SPECIFIED DISORDERS OF BONE DENSITY AND STRUCTURE, OTHER SITE: ICD-10-CM

## 2025-08-29 DIAGNOSIS — R51.0 HEADACHE WITH ORTHOSTATIC COMPONENT, NOT ELSEWHERE CLASSIFIED: ICD-10-CM

## 2025-08-29 DIAGNOSIS — G44.59 OTHER COMPLICATED HEADACHE SYNDROME: ICD-10-CM

## 2025-08-29 DIAGNOSIS — Z00.8 ENCOUNTER FOR OTHER GENERAL EXAMINATION: ICD-10-CM

## 2025-08-29 DIAGNOSIS — H93.A1 PULSATILE TINNITUS, RIGHT EAR: ICD-10-CM

## 2025-08-29 DIAGNOSIS — H53.19 OTHER SUBJECTIVE VISUAL DISTURBANCES: ICD-10-CM

## 2025-08-29 PROCEDURE — 70491 CT SOFT TISSUE NECK W/DYE: CPT | Mod: 26

## 2025-08-29 PROCEDURE — 70460 CT HEAD/BRAIN W/DYE: CPT | Mod: 26

## 2025-08-29 PROCEDURE — 70460 CT HEAD/BRAIN W/DYE: CPT

## 2025-08-29 PROCEDURE — 70491 CT SOFT TISSUE NECK W/DYE: CPT

## 2025-09-10 ENCOUNTER — TRANSCRIPTION ENCOUNTER (OUTPATIENT)
Age: 30
End: 2025-09-10

## 2025-09-11 ENCOUNTER — APPOINTMENT (OUTPATIENT)
Dept: MRI IMAGING | Facility: CLINIC | Age: 30
End: 2025-09-11

## 2025-09-11 ENCOUNTER — TRANSCRIPTION ENCOUNTER (OUTPATIENT)
Age: 30
End: 2025-09-11

## 2025-09-12 ENCOUNTER — TRANSCRIPTION ENCOUNTER (OUTPATIENT)
Age: 30
End: 2025-09-12

## 2025-09-16 ENCOUNTER — APPOINTMENT (OUTPATIENT)
Dept: NEUROLOGY | Facility: CLINIC | Age: 30
End: 2025-09-16

## 2025-09-26 PROBLEM — N84.0 ENDOMETRIAL POLYP: Status: ACTIVE | Noted: 2025-09-26 | Resolved: 2025-12-25
